# Patient Record
Sex: MALE | Race: WHITE | Employment: OTHER | URBAN - METROPOLITAN AREA
[De-identification: names, ages, dates, MRNs, and addresses within clinical notes are randomized per-mention and may not be internally consistent; named-entity substitution may affect disease eponyms.]

---

## 2017-03-01 LAB
ALBUMIN SERPL-MCNC: 3.8 G/DL
ALP BLD-CCNC: 69 U/L
ALT SERPL-CCNC: 55 U/L
AST SERPL-CCNC: 39 U/L
BILIRUB SERPL-MCNC: 1.8 MG/DL (ref 0.1–1.4)
BUN BLDV-MCNC: 18 MG/DL
CALCIUM SERPL-MCNC: 9.3 MG/DL
CHLORIDE BLD-SCNC: 103 MMOL/L
CHOLESTEROL, TOTAL: 147 MG/DL
CHOLESTEROL/HDL RATIO: NORMAL
CO2: 24 MMOL/L
CREAT SERPL-MCNC: 0.8 MG/DL
GFR CALCULATED: NORMAL
GLUCOSE BLD-MCNC: 136 MG/DL
HBA1C MFR BLD: 8.3 %
HDLC SERPL-MCNC: 34 MG/DL (ref 35–70)
LDL CHOLESTEROL CALCULATED: 83 MG/DL (ref 0–160)
POTASSIUM SERPL-SCNC: 4.6 MMOL/L
SODIUM BLD-SCNC: 143 MMOL/L
TOTAL PROTEIN: 6.6
TRIGL SERPL-MCNC: 151 MG/DL
VLDLC SERPL CALC-MCNC: NORMAL MG/DL

## 2017-03-22 ENCOUNTER — OFFICE VISIT (OUTPATIENT)
Dept: CARDIOLOGY CLINIC | Age: 77
End: 2017-03-22

## 2017-03-22 VITALS
HEART RATE: 74 BPM | OXYGEN SATURATION: 94 % | WEIGHT: 191 LBS | HEIGHT: 66 IN | RESPIRATION RATE: 16 BRPM | DIASTOLIC BLOOD PRESSURE: 82 MMHG | BODY MASS INDEX: 30.7 KG/M2 | SYSTOLIC BLOOD PRESSURE: 122 MMHG

## 2017-03-22 DIAGNOSIS — E78.2 MIXED HYPERLIPIDEMIA: ICD-10-CM

## 2017-03-22 DIAGNOSIS — E66.9 OBESITY (BMI 30-39.9): ICD-10-CM

## 2017-03-22 DIAGNOSIS — Z95.1 S/P CABG X 3: Primary | ICD-10-CM

## 2017-03-22 DIAGNOSIS — E11.9 TYPE 2 DIABETES MELLITUS WITHOUT COMPLICATION, WITHOUT LONG-TERM CURRENT USE OF INSULIN (HCC): ICD-10-CM

## 2017-03-22 DIAGNOSIS — R01.1 HEART MURMUR: ICD-10-CM

## 2017-03-22 DIAGNOSIS — I10 ESSENTIAL HYPERTENSION: ICD-10-CM

## 2017-03-22 PROCEDURE — 99214 OFFICE O/P EST MOD 30 MIN: CPT | Performed by: INTERNAL MEDICINE

## 2017-03-22 RX ORDER — ATORVASTATIN CALCIUM 40 MG/1
40 TABLET, FILM COATED ORAL DAILY
COMMUNITY

## 2017-07-13 LAB
ALBUMIN SERPL-MCNC: 3.9 G/DL
ALP BLD-CCNC: 84 U/L
ALT SERPL-CCNC: 45 U/L
ANION GAP SERPL CALCULATED.3IONS-SCNC: NORMAL MMOL/L
AST SERPL-CCNC: 55 U/L
AVERAGE GLUCOSE: NORMAL
BILIRUB SERPL-MCNC: 1.3 MG/DL (ref 0.1–1.4)
BUN BLDV-MCNC: 16 MG/DL
CALCIUM SERPL-MCNC: NORMAL MG/DL
CHLORIDE BLD-SCNC: 104 MMOL/L
CHOLESTEROL, TOTAL: 149 MG/DL
CHOLESTEROL/HDL RATIO: NORMAL
CO2: NORMAL MMOL/L
CREAT SERPL-MCNC: 0.9 MG/DL
GFR CALCULATED: NORMAL
GLUCOSE BLD-MCNC: 108 MG/DL
HBA1C MFR BLD: 8 %
HDLC SERPL-MCNC: 38 MG/DL (ref 35–70)
LDL CHOLESTEROL CALCULATED: 92 MG/DL (ref 0–160)
POTASSIUM SERPL-SCNC: 4.5 MMOL/L
SODIUM BLD-SCNC: 142 MMOL/L
TOTAL PROTEIN: NORMAL
TRIGL SERPL-MCNC: 95 MG/DL
VLDLC SERPL CALC-MCNC: NORMAL MG/DL

## 2017-09-20 ENCOUNTER — HOSPITAL ENCOUNTER (OUTPATIENT)
Dept: GENERAL RADIOLOGY | Age: 77
Discharge: HOME | End: 2017-09-20
Attending: FAMILY MEDICINE | Admitting: FAMILY MEDICINE

## 2017-09-20 DIAGNOSIS — M25.532 LEFT WRIST PAIN: ICD-10-CM

## 2017-09-20 DIAGNOSIS — M25.531 RIGHT WRIST PAIN: ICD-10-CM

## 2017-10-18 ENCOUNTER — OFFICE VISIT (OUTPATIENT)
Dept: ORTHOPEDIC SURGERY | Age: 77
End: 2017-10-18

## 2017-10-18 VITALS — WEIGHT: 196 LBS | HEIGHT: 66 IN | BODY MASS INDEX: 31.5 KG/M2 | RESPIRATION RATE: 16 BRPM

## 2017-10-18 DIAGNOSIS — M19.141 POST-TRAUMATIC OSTEOARTHRITIS OF BOTH HANDS: Primary | ICD-10-CM

## 2017-10-18 DIAGNOSIS — M19.142 POST-TRAUMATIC OSTEOARTHRITIS OF BOTH HANDS: Primary | ICD-10-CM

## 2017-10-18 PROCEDURE — 99244 OFF/OP CNSLTJ NEW/EST MOD 40: CPT | Performed by: PHYSICIAN ASSISTANT

## 2017-10-18 RX ORDER — GLIPIZIDE 10 MG/1
10 TABLET ORAL ONCE
COMMUNITY

## 2017-10-18 ASSESSMENT — ENCOUNTER SYMPTOMS
RESPIRATORY NEGATIVE: 1
EYES NEGATIVE: 1
HEARTBURN: 1

## 2017-10-18 NOTE — PROGRESS NOTES
occluded cx prox occluded rca has prox 50-60 hazy stenosis  lv is normal in size and shape with mild degree of anterior wall hypokinesis ef 55-60    H/O cardiovascular stress test 3/12/13, 3/16/2010    3/13- Abnormal study. Evidence of mild ischemia in the apical region. Rest EF 57%. No wall motion abnormality seen. Exercise capacity 7.0 METS. No change since prior study. 3/10-mild ischemia in the apical region ef is 61 exercise capactiy is normal    H/O cardiovascular stress test 4/8/2015    cardiolite-mild ischemia apical anterior EF59%, study  no changed     H/O chest x-ray 12/26/2000    blunted left costophrenic angle secondary to either pleural effusion or pleural reaction    Heart murmur 3/22/2017    History of PTCA 8/22/1994    LAD    Hyperlipidemia     Hypertension     MI (myocardial infarction) 7/94    S/P CABG x 3     LIMA->LAD, SVG->Mid OM, RCA    SOB (shortness of breath) on exertion 3/18/2015       Past Surgical History:   Procedure Laterality Date    CATARACT REMOVAL  6/12    left eye    CORONARY ARTERY BYPASS GRAFT  12/14/2000    lima-lad svg-om svg-rca    EYE SURGERY      optic tumor removed        History reviewed. No pertinent family history.     Social History     Social History    Marital status:      Spouse name: N/A    Number of children: 1    Years of education: N/A     Occupational History    retired      Social History Main Topics    Smoking status: Never Smoker    Smokeless tobacco: Never Used      Comment: reviewed 03/16/16    Alcohol use No    Drug use: No    Sexual activity: Yes     Partners: Female      Comment:      Other Topics Concern    None     Social History Narrative    None       Current Outpatient Prescriptions   Medication Sig Dispense Refill    glipiZIDE (GLUCOTROL) 10 MG tablet Take 10 mg by mouth once      atorvastatin (LIPITOR) 40 MG tablet Take 40 mg by mouth daily      tamsulosin (FLOMAX) 0.4 MG capsule Take 0.4 mg by mouth daily  lisinopril (PRINIVIL;ZESTRIL) 20 MG tablet Take 1 tablet by mouth daily 30 tablet 6    metFORMIN (GLUCOPHAGE) 500 MG tablet Take 1,000 mg by mouth 2 times daily (with meals)       metoprolol (LOPRESSOR) 50 MG tablet Take 1 tablet by mouth 2 times daily. 180 tablet 3    GuaiFENesin (MUCUS RELIEF ADULT PO) Take  by mouth.  aspirin 81 MG tablet Take 81 mg by mouth daily.  ibuprofen (ADVIL;MOTRIN) 200 MG tablet Take 200 mg by mouth every 6 hours as needed.  multivitamin (THERAGRAN) per tablet Take 1 tablet by mouth daily.  nitroGLYCERIN (NITROSTAT) 0.4 MG SL tablet Place 1 tablet under the tongue every 5 minutes as needed. 25 tablet 3     No current facility-administered medications for this visit. Allergies   Allergen Reactions    Codeine        Physical Exam:   Resp 16   Ht 5' 6\" (1.676 m)   Wt 196 lb (88.9 kg)   BMI 31.64 kg/m²    Patient is alert, appears stated age, cooperative and no distress       Right Hand/ Wrist Examination:    right wrist exam:  Skin: Skin color, texture, turgor normal. No rashes or lesions. Sensation is subjectively and objectively normal in the extremity. Vascular examination reveals 2+ radial and brachial.  Muscular strength is clinically appropriate bilaterally. right wrist exam:  -Swelling is mild over the dorsal wrist  -mild tenderness to palpation of his 1st dorsal compartment, ulnar styloid and FCU    Unless noted above, there is not any erythema, edema or cutaneous lesions of the Arm. Motor Function:  [x] No gross motor weakness of wrist [x] No gross motor weakness of hand  [x]Thumbs Up    [x]Pointer finger    [x]OK sign    [x]Cross fingers    [x]Number 5  [] Motor weakness:     ROM:  Right wrist extension - 10  Right wrist flexion - 10    Left Hand/ Wrist  examination:    Neurologic:  [x] Sensation to light touch intact.   [x] Impaired: decreased over radial aspect of left thumb  [] Decreased sensation to light touch over median moderate 1st carpometacarpal degenerative change. There is question of bony fusion at the triscaphe articulation.       Soft tissue swelling is noted.       Right  wrist:  The patient is status post proximal carpectomy.  Screws are   noted within the capitate and hamate.  Mature ossifications are noted   dorsally about the wrist.  There is soft tissue swelling.  There is lucency   surrounding one of the screws on lateral radiograph.  There is mild 1st   carpometacarpal degenerative change.  There is mild 1st MCP degenerative   change.  There are degenerative changes between the distal radius distal   carpal row.           Impression   1. Severe left wrist degenerative change most pronounced at the radiocarpal   articulation with evidence of SLAC wrist deformity.  Given the mature   ossifications about the wrist this is likely related to prior trauma. Chondrocalcinosis is noted. 2. Prior right proximal row carpectomy.  Postsurgical changes noted in the   carpus.  One of the screws is loose.  Overlying soft tissue swelling.  Mature   dorsal bony fragments.  Chondrocalcinosis. 3. Moderate right wrist degenerative change at the radiocarpal articulation. Impression:  Left wrist and hand OA, Right wrist/hand pain s/p ORIF of the hand      Plan:  The diagnosis and treatment plan was discussed in length with the patient with all questions answered. They are in agreement. We discussed the potential treatment options today. Based on the patient's previous multiple hand surgeries and trauma, I am referring the patient to a hand specialist for their expertise. 1. Continue Blue Emu cream, Voltaren gel rx offered and patient declined  2. Referral to a hand specialist, Dr. Norm Trejo. Provider Authentication Statement  Sameer Ziegler PA-C,  personally performed the services described in this documentation and they were scribed in my presence by the above listed scribe.    The documentation is both

## 2017-10-18 NOTE — Clinical Note
Thank you for the consult, Dr. Maxwell Mehta. Based on the patient's complicated hand and wrist history I am referring him to hand surgery for their expertise. Please feel free to contact me with any concerns.    Sincerely, Dennys Biggs PA-C

## 2018-01-05 LAB
ALBUMIN SERPL-MCNC: 4 G/DL
ALP BLD-CCNC: 83 U/L
ALT SERPL-CCNC: 44 U/L
ANION GAP SERPL CALCULATED.3IONS-SCNC: ABNORMAL MMOL/L
AST SERPL-CCNC: 34 U/L
AVERAGE GLUCOSE: NORMAL
BILIRUB SERPL-MCNC: 1.5 MG/DL (ref 0.1–1.4)
BUN BLDV-MCNC: 15 MG/DL
CALCIUM SERPL-MCNC: ABNORMAL MG/DL
CHLORIDE BLD-SCNC: 105 MMOL/L
CHOLESTEROL, TOTAL: 144 MG/DL
CHOLESTEROL/HDL RATIO: NORMAL
CO2: ABNORMAL MMOL/L
CREAT SERPL-MCNC: 0.9 MG/DL
GFR CALCULATED: ABNORMAL
GLUCOSE BLD-MCNC: 157 MG/DL
HBA1C MFR BLD: 7.9 %
HDLC SERPL-MCNC: 35 MG/DL (ref 35–70)
LDL CHOLESTEROL CALCULATED: 88 MG/DL (ref 0–160)
POTASSIUM SERPL-SCNC: 4 MMOL/L
SODIUM BLD-SCNC: 144 MMOL/L
TOTAL PROTEIN: ABNORMAL
TRIGL SERPL-MCNC: 107 MG/DL
VLDLC SERPL CALC-MCNC: NORMAL MG/DL

## 2018-03-14 ENCOUNTER — PROCEDURE VISIT (OUTPATIENT)
Dept: CARDIOLOGY CLINIC | Age: 78
End: 2018-03-14

## 2018-03-14 DIAGNOSIS — R01.1 HEART MURMUR: Primary | ICD-10-CM

## 2018-03-14 LAB
LV EF: 48 %
LVEF MODALITY: NORMAL

## 2018-03-14 PROCEDURE — 93306 TTE W/DOPPLER COMPLETE: CPT | Performed by: INTERNAL MEDICINE

## 2018-03-16 ENCOUNTER — TELEPHONE (OUTPATIENT)
Dept: CARDIOLOGY CLINIC | Age: 78
End: 2018-03-16

## 2018-05-02 ENCOUNTER — OFFICE VISIT (OUTPATIENT)
Dept: CARDIOLOGY CLINIC | Age: 78
End: 2018-05-02

## 2018-05-02 VITALS
HEART RATE: 74 BPM | SYSTOLIC BLOOD PRESSURE: 122 MMHG | HEIGHT: 66 IN | BODY MASS INDEX: 33.11 KG/M2 | DIASTOLIC BLOOD PRESSURE: 74 MMHG | WEIGHT: 206 LBS

## 2018-05-02 DIAGNOSIS — E66.9 OBESITY (BMI 30-39.9): ICD-10-CM

## 2018-05-02 DIAGNOSIS — R94.31 ABNORMAL ECG: ICD-10-CM

## 2018-05-02 DIAGNOSIS — E11.9 TYPE 2 DIABETES MELLITUS WITHOUT COMPLICATION, WITHOUT LONG-TERM CURRENT USE OF INSULIN (HCC): ICD-10-CM

## 2018-05-02 DIAGNOSIS — Z95.1 S/P CABG X 3: Primary | ICD-10-CM

## 2018-05-02 DIAGNOSIS — I10 ESSENTIAL HYPERTENSION: ICD-10-CM

## 2018-05-02 DIAGNOSIS — E78.2 MIXED HYPERLIPIDEMIA: ICD-10-CM

## 2018-05-02 PROCEDURE — 99214 OFFICE O/P EST MOD 30 MIN: CPT | Performed by: INTERNAL MEDICINE

## 2018-07-13 LAB
ALBUMIN SERPL-MCNC: 4.2 G/DL
ALP BLD-CCNC: 88 U/L
ALT SERPL-CCNC: 47 U/L
ANION GAP SERPL CALCULATED.3IONS-SCNC: ABNORMAL MMOL/L
AST SERPL-CCNC: 44 U/L
AVERAGE GLUCOSE: NORMAL
BILIRUB SERPL-MCNC: 1.6 MG/DL (ref 0.1–1.4)
BUN BLDV-MCNC: 15 MG/DL
CALCIUM SERPL-MCNC: ABNORMAL MG/DL
CHLORIDE BLD-SCNC: 102 MMOL/L
CHOLESTEROL, TOTAL: 157 MG/DL
CHOLESTEROL/HDL RATIO: NORMAL
CO2: ABNORMAL MMOL/L
CREAT SERPL-MCNC: 0.8 MG/DL
GFR CALCULATED: ABNORMAL
GLUCOSE BLD-MCNC: 152 MG/DL
HBA1C MFR BLD: 8.9 %
HDLC SERPL-MCNC: 35 MG/DL (ref 35–70)
LDL CHOLESTEROL CALCULATED: 88 MG/DL (ref 0–160)
POTASSIUM SERPL-SCNC: 4.8 MMOL/L
SODIUM BLD-SCNC: 144 MMOL/L
TOTAL PROTEIN: ABNORMAL
TRIGL SERPL-MCNC: 172 MG/DL
VLDLC SERPL CALC-MCNC: NORMAL MG/DL

## 2018-07-24 ENCOUNTER — HOSPITAL ENCOUNTER (OUTPATIENT)
Dept: ULTRASOUND IMAGING | Age: 78
Discharge: HOME | End: 2018-07-24
Attending: FAMILY MEDICINE | Admitting: FAMILY MEDICINE

## 2018-07-24 DIAGNOSIS — M79.621 AXILLARY PAIN, RIGHT: ICD-10-CM

## 2018-07-24 DIAGNOSIS — R59.9 ENLARGED LYMPH NODES: ICD-10-CM

## 2018-07-31 ENCOUNTER — OFFICE VISIT (OUTPATIENT)
Dept: SURGERY | Age: 78
End: 2018-07-31

## 2018-07-31 VITALS
HEART RATE: 64 BPM | OXYGEN SATURATION: 96 % | SYSTOLIC BLOOD PRESSURE: 140 MMHG | HEIGHT: 66 IN | WEIGHT: 199 LBS | DIASTOLIC BLOOD PRESSURE: 82 MMHG | BODY MASS INDEX: 31.98 KG/M2 | RESPIRATION RATE: 24 BRPM

## 2018-07-31 DIAGNOSIS — M79.621 PAIN IN RIGHT AXILLA: Primary | ICD-10-CM

## 2018-07-31 PROCEDURE — 99212 OFFICE O/P EST SF 10 MIN: CPT | Performed by: SURGERY

## 2018-07-31 ASSESSMENT — ENCOUNTER SYMPTOMS
NAUSEA: 0
HEARTBURN: 0
SPUTUM PRODUCTION: 0
COUGH: 0
VOMITING: 0

## 2018-07-31 NOTE — PROGRESS NOTES
Department of Sugey Cuevas MD   Consult Note      Reason for Consult:  Pain right axilla    Referring Physician:  Dr. Hernandez Party:    Chief Complaint   Patient presents with    Surgical Consult     NP is here for lymph node under right armpit        History Obtained From:  patient    HISTORY OF PRESENT ILLNESS:                The patient is a 78 y.o. male who presents with pain and swelling in the right axilla. He has had no fever or weight loss. Notes the swelling when he is active. US showed no abnormal lymph noddes. .    Past Medical History:    Past Medical History:   Diagnosis Date    CAD (coronary artery disease)     S/P CABG LIMA->LAD, SVG->Mid OM, RCA, PTCA-LAD and MI    Cancer ([facility] [address]     prostate- in remition    DM (diabetes mellitus) ([facility] [address] 3/18/2015    H/O cardiac catheterization 11/21/2000    LM normal, lad gives rise to diag and then is totally occluded cx prox occluded rca has prox 50-60 hazy stenosis  lv is normal in size and shape with mild degree of anterior wall hypokinesis ef 55-60    H/O cardiovascular stress test 3/12/13, 3/16/2010    3/13- Abnormal study. Evidence of mild ischemia in the apical region. Rest EF 57%. No wall motion abnormality seen. Exercise capacity 7.0 METS. No change since prior study.  3/10-mild ischemia in the apical region ef is 61 exercise capactiy is normal    H/O cardiovascular stress test 4/8/2015    cardiolite-mild ischemia apical anterior EF59%, study  no changed     H/O chest x-ray 12/26/2000    blunted left costophrenic angle secondary to either pleural effusion or pleural reaction    Heart murmur 3/22/2017    History of PTCA 8/22/1994    LAD    Hx of Doppler echocardiogram 03/14/2018    EF45-50%,mildly elevated pulmonary artery pressure    Hyperlipidemia     Hypertension     MI (myocardial infarction) 7/94    S/P CABG x 3     LIMA->LAD, SVG->Mid OM, RCA    SOB (shortness of breath) on exertion 3/18/2015      Past Surgical

## 2018-11-20 LAB
ALBUMIN SERPL-MCNC: 3.9 G/DL
ALP BLD-CCNC: 101 U/L
ALT SERPL-CCNC: 32 U/L
ANION GAP SERPL CALCULATED.3IONS-SCNC: ABNORMAL MMOL/L
AST SERPL-CCNC: 29 U/L
AVERAGE GLUCOSE: NORMAL
BILIRUB SERPL-MCNC: 1.8 MG/DL (ref 0.1–1.4)
BUN BLDV-MCNC: 13 MG/DL
CALCIUM SERPL-MCNC: ABNORMAL MG/DL
CHLORIDE BLD-SCNC: 99 MMOL/L
CHOLESTEROL, TOTAL: 157 MG/DL
CHOLESTEROL/HDL RATIO: NORMAL
CO2: ABNORMAL MMOL/L
CREAT SERPL-MCNC: 0.7 MG/DL
GFR CALCULATED: ABNORMAL
GLUCOSE BLD-MCNC: 174 MG/DL
HBA1C MFR BLD: 8.9 %
HDLC SERPL-MCNC: 35 MG/DL (ref 35–70)
LDL CHOLESTEROL CALCULATED: 99 MG/DL (ref 0–160)
POTASSIUM SERPL-SCNC: 4.6 MMOL/L
SODIUM BLD-SCNC: 137 MMOL/L
TOTAL PROTEIN: ABNORMAL
TRIGL SERPL-MCNC: 116 MG/DL
VLDLC SERPL CALC-MCNC: NORMAL MG/DL

## 2019-03-14 LAB
ALBUMIN SERPL-MCNC: 4 G/DL
ALP BLD-CCNC: 83 U/L
ALT SERPL-CCNC: 27 U/L
ANION GAP SERPL CALCULATED.3IONS-SCNC: ABNORMAL MMOL/L
AST SERPL-CCNC: 24 U/L
AVERAGE GLUCOSE: NORMAL
BILIRUB SERPL-MCNC: 2.7 MG/DL (ref 0.1–1.4)
BUN BLDV-MCNC: 14 MG/DL
CALCIUM SERPL-MCNC: ABNORMAL MG/DL
CHLORIDE BLD-SCNC: 102 MMOL/L
CHOLESTEROL, TOTAL: 145 MG/DL
CHOLESTEROL/HDL RATIO: NORMAL
CO2: ABNORMAL MMOL/L
CREAT SERPL-MCNC: 0.8 MG/DL
GFR CALCULATED: ABNORMAL
GLUCOSE BLD-MCNC: 115 MG/DL
HBA1C MFR BLD: 7.5 %
HDLC SERPL-MCNC: 40 MG/DL (ref 35–70)
LDL CHOLESTEROL CALCULATED: 82 MG/DL (ref 0–160)
POTASSIUM SERPL-SCNC: 4.1 MMOL/L
SODIUM BLD-SCNC: 138 MMOL/L
TOTAL PROTEIN: ABNORMAL
TRIGL SERPL-MCNC: 117 MG/DL
VLDLC SERPL CALC-MCNC: NORMAL MG/DL

## 2019-05-20 ENCOUNTER — OFFICE VISIT (OUTPATIENT)
Dept: CARDIOLOGY CLINIC | Age: 79
End: 2019-05-20
Payer: COMMERCIAL

## 2019-05-20 VITALS
HEIGHT: 66 IN | HEART RATE: 80 BPM | BODY MASS INDEX: 31.02 KG/M2 | SYSTOLIC BLOOD PRESSURE: 136 MMHG | WEIGHT: 193 LBS | DIASTOLIC BLOOD PRESSURE: 78 MMHG

## 2019-05-20 DIAGNOSIS — Z95.1 S/P CABG X 3: Primary | ICD-10-CM

## 2019-05-20 DIAGNOSIS — I10 ESSENTIAL HYPERTENSION: ICD-10-CM

## 2019-05-20 DIAGNOSIS — R01.1 HEART MURMUR: ICD-10-CM

## 2019-05-20 DIAGNOSIS — E11.9 TYPE 2 DIABETES MELLITUS WITHOUT COMPLICATION, WITHOUT LONG-TERM CURRENT USE OF INSULIN (HCC): ICD-10-CM

## 2019-05-20 DIAGNOSIS — E78.2 MIXED HYPERLIPIDEMIA: ICD-10-CM

## 2019-05-20 PROCEDURE — 99214 OFFICE O/P EST MOD 30 MIN: CPT | Performed by: INTERNAL MEDICINE

## 2019-05-20 NOTE — PROGRESS NOTES
Marlys Boeck  1940  Justina Tran MD    Chief complaint and HPI:  Marlys Boeck  61-year-old male follows up for coronary artery disease status post bypass surgery in year 2000 and has hypertension and hyperlipidemia and chronic obesity. He has done well cardiac-wise. He denies any chest pain or shortness of breath. He denies any palpitations, syncope or near syncope. He walks for exercise regularly and has been compliant to all his medications. He does not smoke. Rest of the Cardiovascular system review is otherwise unchanged from prior encounter. Past medical history:  has a past medical history of CAD (coronary artery disease), Cancer ([facility] [address] DM (diabetes mellitus) ([facility] [address] H/O cardiac catheterization, H/O cardiovascular stress test, H/O cardiovascular stress test, H/O chest x-ray, Heart murmur, History of PTCA, Hx of Doppler echocardiogram, Hyperlipidemia, Hypertension, MI (myocardial infarction) ([facility] [address] S/P CABG x 3, and SOB (shortness of breath) on exertion. Past surgical history:  has a past surgical history that includes Coronary artery bypass graft (12/14/2000); eye surgery; and Cataract removal (6/12). Social History:   Social History     Tobacco Use    Smoking status: Never Smoker    Smokeless tobacco: Never Used    Tobacco comment: reviewed 03/16/16   Substance Use Topics    Alcohol use: No     Family history: family history is not on file. ALLERGIES:  Codeine  Prior to Admission medications    Medication Sig Start Date End Date Taking?  Authorizing Provider   Ascorbic Acid (VITAMIN C PO) Take by mouth   Yes Historical Provider, MD   glipiZIDE (GLUCOTROL) 10 MG tablet Take 10 mg by mouth once   Yes Historical Provider, MD   atorvastatin (LIPITOR) 40 MG tablet Take 40 mg by mouth daily   Yes Historical Provider, MD   tamsulosin (FLOMAX) 0.4 MG capsule Take 0.4 mg by mouth daily   Yes Historical Provider, MD   lisinopril (PRINIVIL;ZESTRIL) 20 MG tablet Take 1 tablet by mouth daily 3/16/16  Yes Tobi Ye MD   metoprolol (LOPRESSOR) 50 MG tablet Take 1 tablet by mouth 2 times daily. 3/18/15  Yes Tobi Ye MD   GuaiFENesin (MUCUS RELIEF ADULT PO) Take  by mouth. Yes Historical Provider, MD   aspirin 81 MG tablet Take 81 mg by mouth daily. Yes Historical Provider, MD   ibuprofen (ADVIL;MOTRIN) 200 MG tablet Take 200 mg by mouth every 6 hours as needed. Yes Historical Provider, MD   nitroGLYCERIN (NITROSTAT) 0.4 MG SL tablet Place 1 tablet under the tongue every 5 minutes as needed. 3/18/15   Tobi Ye MD     Vitals:    05/20/19 1107   BP: 136/78   Pulse: 80   Weight: 193 lb (87.5 kg)   Height: 5' 6\" (1.676 m)      Body mass index is 31.15 kg/m². Wt Readings from Last 3 Encounters:   05/20/19 193 lb (87.5 kg)   07/31/18 199 lb (90.3 kg)   05/02/18 206 lb (93.4 kg)     Constitutional: Obese pleasant male in no apparent distress. He lost 13 pounds since last office visit. 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  No conjunctival pallor noted  ENT: Unremarkable  NECK: No JVP or thyromegaly  Cardiovascular: Auscultation: Normal S1 and S2.  1/6 systolic murmur in aortic area noted. No gallops. Carotids are negative for bruits. Abdominal aorta is normal.  No epigastric bruit noted. Peripheral pulses: 1+ equal in both feet  Respiratory:  Respiratory effort is normal.  Breath sounds are clear to auscultation. Extremities:  No edema, clubbing,  Cyanosis, petechiae. SKIN: Warm and well perfused, no pallor or cyanosis  Abdomen:  No masses or tenderness. No organomegaly noted. Musculoskeletal:  No spinal deformities noted. Gait is normal  Muscle strength is normal  Neurologic:  Oriented to time, place, and person and non-anxious. No focal neurological deficit noted. Psychiatric: Normal mood and effect.       LAB REVIEW:    Lipids:   Lab Results   Component Value Date    CHOL 145 03/14/2019    TRIG 117 03/14/2019    HDL 40 03/14/2019    LDLCALC 82 03/14/2019     Renal: Lab Results   Component Value Date    BUN 14 03/14/2019    CREATININE 0.80 03/14/2019     03/14/2019    K 4.1 03/14/2019     IMPRESSION and RECOMMENDATIONS:      S/P CABG x 3  Clinically stable. Continue aggressive risk modification for secondary prevention. Last stress test was 4 years ago. Recommend consider repeating a stress test next year sooner if he has any symptoms. Hypertension  Well-controlled on current medications. Continue the same. Hyperlipidemia  Last LDL was 82. Continue current statin therapy. Heart murmur  Clinically unchanged. Echocardiogram last year did not show any significant valvular disease. DM (diabetes mellitus) ([facility] [address]  Most recent hemoglobin A1c was 7.4. Continue follow-up with PCP. Continue current cardiovascular medications which have been reviewed and discussed individually with you. Continue to lose weight and exercise. Primary/secondary prevention is the goal by aggressive risk modification, healthy and therapeutic life style changes for cardiovascular risk reduction. Various goals are discussed and questions answered. Appropriate prescriptions if needed on this visit are addressed. After visit summery is provided. Questions answered and patient verbalizes understanding. Follow up in office in 12 months with ECG, sooner if needed. Ellyn Marvin MD, 5/20/2019 12:23 PM     Please note this report has been partially produced using speech recognition software and may contain errors related to that system including errors in grammar, punctuation, and spelling, as well as words and phrases that may be inappropriate. If there are any questions or concerns please feel free to contact the dictating provider for clarification.

## 2019-05-20 NOTE — PATIENT INSTRUCTIONS
Continue current cardiovascular medications which have been reviewed and discussed individually with you. Continue to lose weight and exercise. Primary/secondary prevention is the goal by aggressive risk modification, healthy and therapeutic life style changes for cardiovascular risk reduction. Various goals are discussed and questions answered. Appropriate prescriptions if needed on this visit are addressed. After visit summery is provided. Questions answered and patient verbalizes understanding. Follow up in office in 12 months with ECG, sooner if needed.

## 2019-05-20 NOTE — ASSESSMENT & PLAN NOTE
Clinically stable. Continue aggressive risk modification for secondary prevention. Last stress test was 4 years ago. Recommend consider repeating a stress test next year sooner if he has any symptoms.

## 2019-10-07 LAB
ALBUMIN SERPL-MCNC: 4 G/DL
ALP BLD-CCNC: 78 U/L
ALT SERPL-CCNC: 34 U/L
ANION GAP SERPL CALCULATED.3IONS-SCNC: NORMAL MMOL/L
AST SERPL-CCNC: 36 U/L
AVERAGE GLUCOSE: NORMAL
BILIRUB SERPL-MCNC: 1.2 MG/DL (ref 0.1–1.4)
BUN BLDV-MCNC: 16 MG/DL
CALCIUM SERPL-MCNC: NORMAL MG/DL
CHLORIDE BLD-SCNC: 101 MMOL/L
CHOLESTEROL, TOTAL: 133 MG/DL
CHOLESTEROL/HDL RATIO: ABNORMAL
CO2: NORMAL MMOL/L
CREAT SERPL-MCNC: 0.8 MG/DL
GFR CALCULATED: NORMAL
GLUCOSE BLD-MCNC: 95 MG/DL
HBA1C MFR BLD: 8.3 %
HDLC SERPL-MCNC: 32 MG/DL (ref 35–70)
LDL CHOLESTEROL CALCULATED: 77 MG/DL (ref 0–160)
POTASSIUM SERPL-SCNC: 4.3 MMOL/L
SODIUM BLD-SCNC: 141 MMOL/L
TOTAL PROTEIN: NORMAL
TRIGL SERPL-MCNC: 119 MG/DL
VLDLC SERPL CALC-MCNC: ABNORMAL MG/DL

## 2020-07-06 LAB
AVERAGE GLUCOSE: NORMAL
CHOLESTEROL, TOTAL: 135 MG/DL
CHOLESTEROL/HDL RATIO: ABNORMAL
HBA1C MFR BLD: 7.2 %
HDLC SERPL-MCNC: 32 MG/DL (ref 35–70)
LDL CHOLESTEROL CALCULATED: 74 MG/DL (ref 0–160)
TRIGL SERPL-MCNC: 145 MG/DL
VLDLC SERPL CALC-MCNC: ABNORMAL MG/DL

## 2020-07-14 ENCOUNTER — OFFICE VISIT (OUTPATIENT)
Dept: CARDIOLOGY CLINIC | Age: 80
End: 2020-07-14
Payer: COMMERCIAL

## 2020-07-14 VITALS
HEIGHT: 66 IN | SYSTOLIC BLOOD PRESSURE: 120 MMHG | WEIGHT: 195 LBS | BODY MASS INDEX: 31.34 KG/M2 | DIASTOLIC BLOOD PRESSURE: 72 MMHG | HEART RATE: 83 BPM

## 2020-07-14 PROCEDURE — 93000 ELECTROCARDIOGRAM COMPLETE: CPT | Performed by: INTERNAL MEDICINE

## 2020-07-14 PROCEDURE — 99214 OFFICE O/P EST MOD 30 MIN: CPT | Performed by: INTERNAL MEDICINE

## 2020-07-14 NOTE — ASSESSMENT & PLAN NOTE
Clinically stable. Has not had a stress test for over 5 years. Will do a nuclear stress test to evaluate coronary disease status post status post bypass surgery.

## 2020-07-14 NOTE — PROGRESS NOTES
capsule Take 0.4 mg by mouth daily   Yes Historical Provider, MD   lisinopril (PRINIVIL;ZESTRIL) 20 MG tablet Take 1 tablet by mouth daily 3/16/16  Yes Mone Pratt MD   metoprolol (LOPRESSOR) 50 MG tablet Take 1 tablet by mouth 2 times daily. 3/18/15  Yes Mone Pratt MD   nitroGLYCERIN (NITROSTAT) 0.4 MG SL tablet Place 1 tablet under the tongue every 5 minutes as needed. 3/18/15  Yes Mone Pratt MD   GuaiFENesin (MUCUS RELIEF ADULT PO) Take  by mouth. Yes Historical Provider, MD   aspirin 81 MG tablet Take 81 mg by mouth daily. Yes Historical Provider, MD   ibuprofen (ADVIL;MOTRIN) 200 MG tablet Take 200 mg by mouth every 6 hours as needed. Yes Historical Provider, MD     Vitals:    07/14/20 0943   BP: 120/72   Pulse: 83   Weight: 195 lb (88.5 kg)   Height: 5' 6\" (1.676 m)      Body mass index is 31.47 kg/m². Wt Readings from Last 3 Encounters:   07/14/20 195 lb (88.5 kg)   05/20/19 193 lb (87.5 kg)   07/31/18 199 lb (90.3 kg)     Constitutional: Obese pleasant male in no apparent distress.  He gained 2 pounds since last office visit. 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  No conjunctival pallor noted  ENT: Unremarkable  NECK: No JVP or thyromegaly  Cardiovascular:  Auscultation: Normal S1 and J2.  4/9 systolic murmur in aortic area noted.  No gallops. Carotids are negative for bruits.  Abdominal aorta is normal.  No epigastric bruit noted. Peripheral pulses: 1+ equal in both feet  Respiratory:  Respiratory effort is normal.  Breath sounds are clear to auscultation. Extremities:  No edema, clubbing,  Cyanosis, petechiae. SKIN: Warm and well perfused, no pallor or cyanosis  Abdomen:  No masses or tenderness. No organomegaly noted. Musculoskeletal:  No spinal deformities noted.  Gait is normal  Muscle strength is normal  Neurologic:  Oriented to time, place, and person and non-anxious. No focal neurological deficit noted.   Psychiatric: Normal mood and effect.     EKG today is consistent with normal sinus rhythm 83 bpm old anterior infarction occasional PVCs and left axis deviation. LAB REVIEW:  CBC: No results found for: WBC, HGB, HCT, PLT  Lipids:   Lab Results   Component Value Date    CHOL 135 07/06/2020    TRIG 145 07/06/2020    HDL 32 (A) 07/06/2020    LDLCALC 74 07/06/2020     Renal:   Lab Results   Component Value Date    BUN 16 10/07/2019    CREATININE 0.80 10/07/2019     10/07/2019    K 4.3 10/07/2019     PT/INR: No results found for: INR  Lab Results   Component Value Date    LABA1C 7.2 07/06/2020       IMPRESSION and RECOMMENDATIONS:      Hypertension  Well-controlled on current medications continue the same. Hyperlipidemia  Lipids from July 6, 2020 are reviewed. LDL was 74. Continue current statin therapy. S/P CABG x 3  Clinically stable. Has not had a stress test for over 5 years. Will do a nuclear stress test to evaluate coronary disease status post status post bypass surgery. 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 Stress Cardiolite and Echo to evaluate CAD status ( had not had testing since 2015). Continue current cardiovascular medications which have been reviewed and discussed individually with you. Primary/secondary prevention is the goal by aggressive risk modification, healthy and therapeutic life style changes for cardiovascular risk reduction. Various goals are discussed and questions answered. Appropriate prescriptions if needed on this visit are addressed. After visit summery is provided. Aranza Mckinley    We have discussed Coronavirus precaution including handwashing practice, wiping items touched in public such as gas pumps, door handles, shopping carts, etc. Self monitoring for infection - fever, chills, cough, SOB. Should they develop symptoms they should call PCP office for further instructions. ons answered and patient verbalizes understanding.  Follow up in 12 months with ECG,  sooner if needed. Moon Farmer MD, 7/14/2020 10:04 AM     Please note this report has been partially produced using speech recognition software and may contain errors related to that system including errors in grammar, punctuation, and spelling, as well as words and phrases that may be inappropriate. If there are any questions or concerns please feel free to contact the dictating provider for clarification.

## 2020-07-14 NOTE — PATIENT INSTRUCTIONS
Stress Cardiolite and Echo to evaluate CAD status ( had not had testing since 2015). Continue current cardiovascular medications which have been reviewed and discussed individually with you. Primary/secondary prevention is the goal by aggressive risk modification, healthy and therapeutic life style changes for cardiovascular risk reduction. Various goals are discussed and questions answered. Appropriate prescriptions if needed on this visit are addressed. After visit summery is provided. Questions answered and patient verbalizes understanding. Follow up in 12 months with ECG,  sooner if needed. We have discussed Coronavirus precaution including handwashing practice, wiping items touched in public such as gas pumps, door handles, shopping carts, etc. Self monitoring for infection - fever, chills, cough, SOB. Should they develop symptoms they should call PCP office for further instructions.

## 2020-08-06 ENCOUNTER — PROCEDURE VISIT (OUTPATIENT)
Dept: CARDIOLOGY CLINIC | Age: 80
End: 2020-08-06
Payer: MEDICARE

## 2020-08-06 LAB
LV EF: 53 %
LV EF: 54 %
LVEF MODALITY: NORMAL
LVEF MODALITY: NORMAL

## 2020-08-06 PROCEDURE — A9500 TC99M SESTAMIBI: HCPCS | Performed by: INTERNAL MEDICINE

## 2020-08-06 PROCEDURE — 93015 CV STRESS TEST SUPVJ I&R: CPT | Performed by: INTERNAL MEDICINE

## 2020-08-06 PROCEDURE — 93306 TTE W/DOPPLER COMPLETE: CPT | Performed by: INTERNAL MEDICINE

## 2020-08-06 PROCEDURE — 78452 HT MUSCLE IMAGE SPECT MULT: CPT | Performed by: INTERNAL MEDICINE

## 2020-08-12 ENCOUNTER — TELEPHONE (OUTPATIENT)
Dept: CARDIOLOGY CLINIC | Age: 80
End: 2020-08-12

## 2020-08-12 NOTE — TELEPHONE ENCOUNTER
Left ventricular perfusion is abnormal suggesting apical infarction without     significant ischemia. Diaphragmatic attenuation is suspected.     Normal Left ventricular size with apical hypokinesis and and preserved     systolic function. Ejection fraction is 54 %.       Recommendation     Continue aggressive lifestyle and risk modification and medical therapy.           Pt notified of ECHO and Stress test results and routine f/u; pt voiced understanding.

## 2021-01-08 LAB
ALBUMIN SERPL-MCNC: 4.3 G/DL
ALP BLD-CCNC: 83 U/L
ALT SERPL-CCNC: 38 U/L
ANION GAP SERPL CALCULATED.3IONS-SCNC: ABNORMAL MMOL/L
AST SERPL-CCNC: 45 U/L
AVERAGE GLUCOSE: NORMAL
BILIRUB SERPL-MCNC: 2.1 MG/DL (ref 0.1–1.4)
BUN BLDV-MCNC: 19 MG/DL
CALCIUM SERPL-MCNC: ABNORMAL MG/DL
CHLORIDE BLD-SCNC: 101 MMOL/L
CHOLESTEROL, TOTAL: 132 MG/DL
CHOLESTEROL/HDL RATIO: NORMAL
CO2: ABNORMAL
CREAT SERPL-MCNC: 0.9 MG/DL
GFR CALCULATED: ABNORMAL
GLUCOSE BLD-MCNC: 130 MG/DL
HBA1C MFR BLD: 7.4 %
HDLC SERPL-MCNC: 36 MG/DL (ref 35–70)
LDL CHOLESTEROL CALCULATED: 65 MG/DL (ref 0–160)
NONHDLC SERPL-MCNC: NORMAL MG/DL
POTASSIUM SERPL-SCNC: 4.6 MMOL/L
SODIUM BLD-SCNC: 140 MMOL/L
TOTAL PROTEIN: ABNORMAL
TRIGL SERPL-MCNC: 155 MG/DL
VLDLC SERPL CALC-MCNC: NORMAL MG/DL

## 2021-07-13 ENCOUNTER — OFFICE VISIT (OUTPATIENT)
Dept: CARDIOLOGY CLINIC | Age: 81
End: 2021-07-13
Payer: MEDICARE

## 2021-07-13 VITALS
HEART RATE: 74 BPM | BODY MASS INDEX: 30.86 KG/M2 | WEIGHT: 192 LBS | DIASTOLIC BLOOD PRESSURE: 72 MMHG | SYSTOLIC BLOOD PRESSURE: 138 MMHG | HEIGHT: 66 IN | RESPIRATION RATE: 16 BRPM

## 2021-07-13 DIAGNOSIS — E78.2 MIXED HYPERLIPIDEMIA: ICD-10-CM

## 2021-07-13 DIAGNOSIS — I10 ESSENTIAL HYPERTENSION: Primary | ICD-10-CM

## 2021-07-13 DIAGNOSIS — E08.00 DIABETES MELLITUS DUE TO UNDERLYING CONDITION WITH HYPEROSMOLARITY WITHOUT COMA, WITHOUT LONG-TERM CURRENT USE OF INSULIN (HCC): ICD-10-CM

## 2021-07-13 DIAGNOSIS — Z95.1 HX OF CABG: ICD-10-CM

## 2021-07-13 DIAGNOSIS — Z95.1 S/P CABG X 3: ICD-10-CM

## 2021-07-13 LAB
ALBUMIN SERPL-MCNC: 3.9 G/DL
ALP BLD-CCNC: 75 U/L
ALT SERPL-CCNC: 26 U/L
ANION GAP SERPL CALCULATED.3IONS-SCNC: 7 MMOL/L
AST SERPL-CCNC: 37 U/L
AVERAGE GLUCOSE: NORMAL
BILIRUB SERPL-MCNC: 1 MG/DL (ref 0.1–1.4)
BUN BLDV-MCNC: 15 MG/DL
CALCIUM SERPL-MCNC: 8.8 MG/DL
CHLORIDE BLD-SCNC: 103 MMOL/L
CHOLESTEROL, TOTAL: 117 MG/DL
CHOLESTEROL/HDL RATIO: 2
CO2: 28 MMOL/L
CREAT SERPL-MCNC: 0.8 MG/DL
GFR CALCULATED: 99
GLUCOSE BLD-MCNC: 103 MG/DL
HBA1C MFR BLD: 8 %
HDLC SERPL-MCNC: 36 MG/DL (ref 35–70)
LDL CHOLESTEROL CALCULATED: 60 MG/DL (ref 0–160)
NONHDLC SERPL-MCNC: NORMAL MG/DL
POTASSIUM SERPL-SCNC: 5.8 MMOL/L
SODIUM BLD-SCNC: 139 MMOL/L
TOTAL PROTEIN: 7
TRIGL SERPL-MCNC: 105 MG/DL
VLDLC SERPL CALC-MCNC: 21 MG/DL

## 2021-07-13 PROCEDURE — 93000 ELECTROCARDIOGRAM COMPLETE: CPT | Performed by: INTERNAL MEDICINE

## 2021-07-13 PROCEDURE — 99214 OFFICE O/P EST MOD 30 MIN: CPT | Performed by: INTERNAL MEDICINE

## 2021-07-13 RX ORDER — OMEPRAZOLE 20 MG/1
20 CAPSULE, DELAYED RELEASE ORAL DAILY PRN
COMMUNITY

## 2021-07-13 NOTE — PROGRESS NOTES
Rossi Daniels (:  1940) is a 68 y.o. male,     Chief Complaint   Patient presents with    Coronary Artery Disease     Pt denies any new cardiac concerns. Pt is non-smoker.  Hypertension    Hyperlipidemia     Patient is here for follow up for known coronary artery disease status post three-vessel bypass surgery in year  and has hyper lipidemia hypertension type 2 diabetes. He does very well cardiac wise. He denies any chest pain or shortness of breath or palpitations. He walks regularly for exercise. He is fully vaccinated for COVID-19. He has been compliant with his medication but he brought them all and we have reviewed them individually. He does not smoke. Allergies   Allergen Reactions    Codeine      Prior to Admission medications    Medication Sig Start Date End Date Taking? Authorizing Provider   mupirocin (BACTROBAN) 2 % ointment Apply topically daily 20  Yes Historical Provider, MD   omeprazole (PRILOSEC) 20 MG delayed release capsule Take 20 mg by mouth daily as needed   Yes Historical Provider, MD   Misc Natural Products (GLUCOSAMINE CHOND CMP ADVANCED PO) Take by mouth daily   Yes Historical Provider, MD   metFORMIN (GLUCOPHAGE) 500 MG tablet Take 500 mg by mouth 2 times daily (with meals)   Yes Historical Provider, MD   Ascorbic Acid (VITAMIN C PO) Take by mouth   Yes Historical Provider, MD   glipiZIDE (GLUCOTROL) 10 MG tablet Take 10 mg by mouth once   Yes Historical Provider, MD   atorvastatin (LIPITOR) 40 MG tablet Take 40 mg by mouth daily   Yes Historical Provider, MD   tamsulosin (FLOMAX) 0.4 MG capsule Take 0.4 mg by mouth daily   Yes Historical Provider, MD   lisinopril (PRINIVIL;ZESTRIL) 20 MG tablet Take 1 tablet by mouth daily 3/16/16  Yes Radha Mueller MD   metoprolol (LOPRESSOR) 50 MG tablet Take 1 tablet by mouth 2 times daily.  3/18/15  Yes Radha Mueller MD   nitroGLYCERIN (NITROSTAT) 0.4 MG SL tablet Place 1 tablet under the tongue every 5 minutes as needed. 3/18/15  Yes Steven Stevenson MD   GuaiFENesin (MUCUS RELIEF ADULT PO) Take  by mouth. Yes Historical Provider, MD   aspirin 81 MG tablet Take 81 mg by mouth daily. Yes Historical Provider, MD   ibuprofen (ADVIL;MOTRIN) 200 MG tablet Take 200 mg by mouth every 6 hours as needed. Yes Historical Provider, MD     Past Medical History:   Diagnosis Date    CAD (coronary artery disease)     S/P CABG LIMA->LAD, SVG->Mid OM, RCA, PTCA-LAD and MI    Cancer ([facility] [address]     prostate- in remition    DM (diabetes mellitus) ([facility] [address] 3/18/2015    H/O cardiac catheterization 11/21/2000    LM normal, lad gives rise to diag and then is totally occluded cx prox occluded rca has prox 50-60 hazy stenosis  lv is normal in size and shape with mild degree of anterior wall hypokinesis ef 55-60    H/O cardiovascular stress test 3/12/13, 3/16/2010    3/13- Abnormal study. Evidence of mild ischemia in the apical region. Rest EF 57%. No wall motion abnormality seen. Exercise capacity 7.0 METS. No change since prior study. 3/10-mild ischemia in the apical region ef is 61 exercise capactiy is normal    H/O cardiovascular stress test 4/8/2015    cardiolite-mild ischemia apical anterior EF59%, study  no changed     H/O chest x-ray 12/26/2000    blunted left costophrenic angle secondary to either pleural effusion or pleural reaction    Heart murmur 3/22/2017    History of nuclear stress test 08/06/2020    Left ventricular perfusion is abnormal suggesting apical infarction without significant ischemia. Diaphragmatic attenuation is suspected. EF 54%.  History of PTCA 8/22/1994    LAD    Hx of Doppler echocardiogram 03/14/2018; 8/6/2020    EF 50-55%.   mild aortic and mitral regurgitation    Hyperlipidemia     Hypertension     MI (myocardial infarction) ([facility] [address] 7/94    S/P CABG x 3 12/14/2000    LIMA->LAD, SVG->Mid OM, RCA    SOB (shortness of breath) on exertion 3/18/2015      Vitals:    07/13/21 1009   BP: 138/72   Pulse: 74   Resp: 16   Weight: 192 lb (87.1 kg)   Height: 5' 6\" (1.676 m)      Body mass index is 30.99 kg/m². Wt Readings from Last 3 Encounters:   07/13/21 192 lb (87.1 kg)   07/14/20 195 lb (88.5 kg)   05/20/19 193 lb (87.5 kg)     Constitutional: Obese pleasant male in no apparent distress.  He lost 3 pounds since last office visit. 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  No conjunctival pallor noted  ENT: Wearing mask and glasses  NECK: No JVP or thyromegaly  Cardiovascular:  Auscultation: Normal S1 and Y4.  1/3 systolic murmur in aortic area noted.  No gallops. Carotids are negative for bruits.  Abdominal aorta is normal.  No epigastric bruit noted. Peripheral pulses: 1+ equal in both feet  Respiratory:  Respiratory effort is normal.  Breath sounds are clear to auscultation. Extremities:  No edema, clubbing,  Cyanosis, petechiae. SKIN: Warm and well perfused, no pallor or cyanosis  Abdomen:  No masses or tenderness. No organomegaly noted. Musculoskeletal:  No spinal deformities noted.  Gait is normal  Muscle strength is normal  Neurologic:  Oriented to time, place, and person and non-anxious. No focal neurological deficit noted. 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      Pertinent records reviewed and discussed with patient and results are as follow:    No results found for: WBC, HGB, HCT, PLT  Lab Results   Component Value Date    CHOL 132 01/08/2021    TRIG 155 01/08/2021    HDL 36 01/08/2021    LDLCALC 65 01/08/2021     Lab Results   Component Value Date    BUN 19 01/08/2021    CREATININE 0.90 01/08/2021     01/08/2021    K 4.6 01/08/2021     No results found for: INR  Lab Results   Component Value Date    LABA1C 7.4 01/08/2021     ASSESSMENT/PLAN:    1. Essential hypertension  -     EKG 12 lead  2. Hx of CABG  -     EKG 12 lead  3. S/P CABG x 3  4. Mixed hyperlipidemia  5.  Diabetes mellitus due to underlying condition with hyperosmolarity without coma, without long-term current use of insulin ([address]    Continue current cardiovascular medications which have been reviewed and discussed individually with you. Primary/secondary prevention is the goal by aggressive risk modification, healthy and therapeutic life style changes for cardiovascular risk reduction. Various goals are discussed and questions answered. Follow-up in 12 months with EKG, sooner if needed. An electronic signature was used to authenticate this note.     --Gamal Cassidy MD

## 2021-07-13 NOTE — PATIENT INSTRUCTIONS
Continue current cardiovascular medications which have been reviewed and discussed individually with you. Primary/secondary prevention is the goal by aggressive risk modification, healthy and therapeutic life style changes for cardiovascular risk reduction. Various goals are discussed and questions answered. Follow-up in 12 months with EKG, sooner if needed.

## 2022-01-12 LAB
BASOPHILS ABSOLUTE: ABNORMAL
BASOPHILS RELATIVE PERCENT: ABNORMAL
CHOLESTEROL, TOTAL: 132 MG/DL
CHOLESTEROL/HDL RATIO: NORMAL
EOSINOPHILS ABSOLUTE: ABNORMAL
EOSINOPHILS RELATIVE PERCENT: ABNORMAL
HCT VFR BLD CALC: ABNORMAL %
HDLC SERPL-MCNC: 42 MG/DL (ref 35–70)
HEMOGLOBIN: 7.7 G/DL (ref 13.5–17.5)
LDL CHOLESTEROL CALCULATED: 64 MG/DL (ref 0–160)
LDL CHOLESTEROL DIRECT: 64 MG/DL
LYMPHOCYTES ABSOLUTE: ABNORMAL
LYMPHOCYTES RELATIVE PERCENT: ABNORMAL
MCH RBC QN AUTO: ABNORMAL PG
MCHC RBC AUTO-ENTMCNC: ABNORMAL G/DL
MCV RBC AUTO: ABNORMAL FL
MONOCYTES ABSOLUTE: ABNORMAL
MONOCYTES RELATIVE PERCENT: ABNORMAL
NEUTROPHILS ABSOLUTE: ABNORMAL
NEUTROPHILS RELATIVE PERCENT: ABNORMAL
NONHDLC SERPL-MCNC: NORMAL MG/DL
PLATELET # BLD: ABNORMAL 10*3/UL
PMV BLD AUTO: ABNORMAL FL
RBC # BLD: ABNORMAL 10*6/UL
TRIGL SERPL-MCNC: 130 MG/DL
VLDLC SERPL CALC-MCNC: 26 MG/DL
WBC # BLD: ABNORMAL 10*3/UL

## 2022-07-12 ENCOUNTER — OFFICE VISIT (OUTPATIENT)
Dept: CARDIOLOGY CLINIC | Age: 82
End: 2022-07-12
Payer: MEDICARE

## 2022-07-12 VITALS
HEIGHT: 66 IN | WEIGHT: 190 LBS | SYSTOLIC BLOOD PRESSURE: 118 MMHG | BODY MASS INDEX: 30.53 KG/M2 | DIASTOLIC BLOOD PRESSURE: 78 MMHG | RESPIRATION RATE: 16 BRPM | HEART RATE: 93 BPM

## 2022-07-12 DIAGNOSIS — Z95.1 S/P CABG X 3: Primary | ICD-10-CM

## 2022-07-12 DIAGNOSIS — E08.00 DIABETES MELLITUS DUE TO UNDERLYING CONDITION WITH HYPEROSMOLARITY WITHOUT COMA, WITHOUT LONG-TERM CURRENT USE OF INSULIN (HCC): ICD-10-CM

## 2022-07-12 DIAGNOSIS — E78.00 PURE HYPERCHOLESTEROLEMIA: ICD-10-CM

## 2022-07-12 DIAGNOSIS — I10 PRIMARY HYPERTENSION: ICD-10-CM

## 2022-07-12 PROCEDURE — 1123F ACP DISCUSS/DSCN MKR DOCD: CPT | Performed by: INTERNAL MEDICINE

## 2022-07-12 PROCEDURE — 93000 ELECTROCARDIOGRAM COMPLETE: CPT | Performed by: INTERNAL MEDICINE

## 2022-07-12 PROCEDURE — 99214 OFFICE O/P EST MOD 30 MIN: CPT | Performed by: INTERNAL MEDICINE

## 2022-07-12 NOTE — PROGRESS NOTES
Rosangela Huffman  1940  Nicole Castorena MD      Chief Complaint   Patient presents with    Coronary Artery Disease     Pt denies any new cardiac concerns. Pt is non-smoker.  Hypertension    Hyperlipidemia     Chief complaint and HPI:  Rosangela Huffman  is a 80 y.o. male following up known coronary artery disease status post bypass surgery in year 2000 and has hypertension hyperlipidemia and diabetes. He denies any cardiac symptoms. He walks 25 minutes a day for exercise denies any chest pain or shortness of breath or palpitations. He finds himself somewhat unsteady when he gets up to the exam table but denies having any falls since last seen a year ago. Denies any unsteadiness when he is walking for exercise. He is compliant to his medications. He does not smoke. Rest of the Cardiovascular system review is otherwise unchanged from prior encounter. Past medical history:  has a past medical history of CAD (coronary artery disease), Cancer ([facility] [address] DM (diabetes mellitus) ([facility] [address] H/O cardiac catheterization, H/O cardiovascular stress test, H/O cardiovascular stress test, H/O chest x-ray, Heart murmur, History of nuclear stress test, History of PTCA, Hx of Doppler echocardiogram, Hyperlipidemia, Hypertension, MI (myocardial infarction) ([facility] [address] S/P CABG x 3, and SOB (shortness of breath) on exertion. Past surgical history:  has a past surgical history that includes Coronary artery bypass graft (12/14/2000); eye surgery; and Cataract removal (6/12). Social History:   Social History     Tobacco Use    Smoking status: Never Smoker    Smokeless tobacco: Never Used    Tobacco comment: reviewed 03/16/16   Substance Use Topics    Alcohol use: No     Family history: family history is not on file. ALLERGIES:  Codeine  Prior to Admission medications    Medication Sig Start Date End Date Taking?  Authorizing Provider   mupirocin (BACTROBAN) 2 % ointment Apply topically daily 11/2/20  Yes Historical Provider, MD omeprazole (PRILOSEC) 20 MG delayed release capsule Take 20 mg by mouth daily as needed   Yes Historical Provider, MD   Misc Natural Products (GLUCOSAMINE CHOND CMP ADVANCED PO) Take by mouth daily   Yes Historical Provider, MD   metFORMIN (GLUCOPHAGE) 500 MG tablet Take 500 mg by mouth 2 times daily (with meals)   Yes Historical Provider, MD   Ascorbic Acid (VITAMIN C PO) Take by mouth   Yes Historical Provider, MD   glipiZIDE (GLUCOTROL) 10 MG tablet Take 10 mg by mouth once   Yes Historical Provider, MD   atorvastatin (LIPITOR) 40 MG tablet Take 40 mg by mouth daily   Yes Historical Provider, MD   tamsulosin (FLOMAX) 0.4 MG capsule Take 0.4 mg by mouth daily   Yes Historical Provider, MD   lisinopril (PRINIVIL;ZESTRIL) 20 MG tablet Take 1 tablet by mouth daily 3/16/16  Yes Alejo Jennings MD   metoprolol (LOPRESSOR) 50 MG tablet Take 1 tablet by mouth 2 times daily. 3/18/15  Yes Alejo Jennings MD   nitroGLYCERIN (NITROSTAT) 0.4 MG SL tablet Place 1 tablet under the tongue every 5 minutes as needed. 3/18/15  Yes Alejo Jennings MD   GuaiFENesin (MUCUS RELIEF ADULT PO) Take  by mouth. Yes Historical Provider, MD   aspirin 81 MG tablet Take 81 mg by mouth daily. Yes Historical Provider, MD   ibuprofen (ADVIL;MOTRIN) 200 MG tablet Take 200 mg by mouth every 6 hours as needed. Yes Historical Provider, MD     Vitals:    07/12/22 1034   BP: 118/78   Pulse: 93   Resp: 16   Weight: 190 lb (86.2 kg)   Height: 5' 6\" (1.676 m)      Body mass index is 30.67 kg/m². Wt Readings from Last 3 Encounters:   07/12/22 190 lb (86.2 kg)   07/13/21 192 lb (87.1 kg)   07/14/20 195 lb (88.5 kg)     Constitutional: Obese pleasant male in no apparent distress.  He lost 2 pounds since last office visit. 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  No conjunctival pallor noted  ENT: Wearing mask and glasses  NECK: No JVP or thyromegaly  Cardiovascular:  Auscultation: Normal S1 and M0.  4/8 systolic murmur in aortic area noted.  Carotids are negative for bruits.  Abdominal aorta is normal.  No epigastric bruit noted. Peripheral pulses: 1+ equal in both feet  Respiratory:  Respiratory effort is normal. Breath sounds are clear to auscultation. Extremities:  No edema, clubbing,  Cyanosis, petechiae. SKIN: Warm and well perfused, no pallor or cyanosis  Abdomen:  No masses or tenderness. No organomegaly noted. Musculoskeletal:  No spinal deformities noted.  Gait is normal  Muscle strength is normal  Neurologic:  Oriented to time, place, and person and non-anxious. No focal neurological deficit noted. Psychiatric: Normal mood and effect    EKG today is consistent with sinus rhythm 93 bpm with evidence of old anterior infarction and left axis deviation. LAB REVIEW:  CBC:   Lab Results   Component Value Date/Time    HGB 7.7 01/12/2022 12:00 AM     Lipids:   Lab Results   Component Value Date    CHOL 132 01/12/2022    TRIG 130 01/12/2022    HDL 42 01/12/2022    LDLCALC 64 01/12/2022     Renal:   Lab Results   Component Value Date/Time    BUN 15 07/13/2021 12:00 AM    CREATININE 0.80 07/13/2021 12:00 AM     07/13/2021 12:00 AM    K 5.8 07/13/2021 12:00 AM   Recent Data from [facility] [address]  Related to HEMOGLOBIN A1C  Component 01/12/22 07/13/21 01/08/21   HEMOGLOBIN A1C 7.7  8.0 High   7.4 High       IMPRESSION and RECOMMENDATIONS:      Hypertension  Well-controlled on metoprolol and Zestril. Continue both. Hyperlipidemia  Well-controlled on current dose of atorvastatin 40 mg daily. Last LDL was 63. Continue the same. S/P CABG x 3, 2000  Clinically stable. Continue aggressive risk factor modification for secondary prevention. Last stress test was in 2020. Abnormal ECG  Last hemoglobin A1c was 7.7 which is an improvement from 8.0 last year. Follows up with PCP closely and he takes Glucotrol and metformin.      Continue current cardiovascular medications which have been reviewed and discussed individually with you. Primary/secondary prevention is the goal by aggressive risk modification, healthy and therapeutic life style changes for cardiovascular risk reduction. Various goals are discussed and questions answered. Appropriate prescriptions if needed on this visit are addressed. After visit summery is provided. Questions answered and patient verbalizes understanding. Follow up in 12 months with EKG,  sooner if needed. Val Hurtado MD, 7/12/2022 11:01 AM     Please note this report has been partially produced using speech recognition software and may contain errors related to that system including errors in grammar, punctuation, and spelling, as well as words and phrases that may be inappropriate. If there are any questions or concerns please feel free to contact the dictating provider for clarification.

## 2022-07-12 NOTE — ASSESSMENT & PLAN NOTE
Last hemoglobin A1c was 7.7 which is an improvement from 8.0 last year. Follows up with PCP closely and he takes Glucotrol and metformin.

## 2022-07-12 NOTE — ASSESSMENT & PLAN NOTE
Clinically stable. Continue aggressive risk factor modification for secondary prevention. Last stress test was in 2020.

## 2022-07-12 NOTE — PATIENT INSTRUCTIONS
Continue current cardiovascular medications which have been reviewed and discussed individually with you. Primary/secondary prevention is the goal by aggressive risk modification, healthy and therapeutic life style changes for cardiovascular risk reduction. Various goals are discussed and questions answered. Appropriate prescriptions if needed on this visit are addressed. After visit summery is provided. Questions answered and patient verbalizes understanding. Follow up in 12 months with EKG,  sooner if needed.

## 2022-07-27 ENCOUNTER — HOSPITAL ENCOUNTER (OUTPATIENT)
Age: 82
Discharge: HOME | End: 2022-07-27
Payer: MEDICARE

## 2022-07-27 LAB — PROSTATE SPECIFIC ANTIGEN: 0.02 NG/ML (ref 0–4)

## 2022-07-27 PROCEDURE — 84153 ASSAY OF PSA TOTAL: CPT

## 2022-07-27 PROCEDURE — 36415 COLL VENOUS BLD VENIPUNCTURE: CPT

## 2022-07-27 PROCEDURE — G0103 PSA SCREENING: HCPCS

## 2023-02-20 LAB
ALBUMIN SERPL-MCNC: 4.5 G/DL
ALP BLD-CCNC: 81 U/L
ALT SERPL-CCNC: 32 U/L
ANION GAP SERPL CALCULATED.3IONS-SCNC: 12.2 MMOL/L
AST SERPL-CCNC: 34 U/L
AVERAGE GLUCOSE: NORMAL
BILIRUB SERPL-MCNC: 1.3 MG/DL (ref 0.1–1.4)
BUN BLDV-MCNC: 19 MG/DL
CALCIUM SERPL-MCNC: 9.3 MG/DL
CHLORIDE BLD-SCNC: 104 MMOL/L
CHOLESTEROL, TOTAL: 139 MG/DL
CHOLESTEROL/HDL RATIO: 75
CO2: 28 MMOL/L
CREAT SERPL-MCNC: 0.88 MG/DL
EGFR: 88
GLUCOSE BLD-MCNC: 80 MG/DL
HBA1C MFR BLD: 8.3 %
HDLC SERPL-MCNC: 37 MG/DL (ref 35–70)
LDL CHOLESTEROL CALCULATED: 75 MG/DL (ref 0–160)
NONHDLC SERPL-MCNC: NORMAL MG/DL
POTASSIUM SERPL-SCNC: 4.9 MMOL/L
SODIUM BLD-SCNC: 144 MMOL/L
TOTAL PROTEIN: 7.7
TRIGL SERPL-MCNC: 133 MG/DL
VLDLC SERPL CALC-MCNC: 27 MG/DL

## 2023-07-18 ENCOUNTER — OFFICE VISIT (OUTPATIENT)
Dept: CARDIOLOGY CLINIC | Age: 83
End: 2023-07-18
Payer: MEDICARE

## 2023-07-18 VITALS
HEART RATE: 85 BPM | WEIGHT: 189.4 LBS | BODY MASS INDEX: 29.73 KG/M2 | HEIGHT: 67 IN | DIASTOLIC BLOOD PRESSURE: 84 MMHG | SYSTOLIC BLOOD PRESSURE: 152 MMHG

## 2023-07-18 DIAGNOSIS — I10 ESSENTIAL HYPERTENSION: ICD-10-CM

## 2023-07-18 DIAGNOSIS — E08.00 DIABETES MELLITUS DUE TO UNDERLYING CONDITION WITH HYPEROSMOLARITY WITHOUT COMA, WITHOUT LONG-TERM CURRENT USE OF INSULIN (HCC): ICD-10-CM

## 2023-07-18 DIAGNOSIS — Z95.1 S/P CABG X 3: Primary | ICD-10-CM

## 2023-07-18 DIAGNOSIS — I10 PRIMARY HYPERTENSION: ICD-10-CM

## 2023-07-18 DIAGNOSIS — E78.00 PURE HYPERCHOLESTEROLEMIA: ICD-10-CM

## 2023-07-18 PROCEDURE — 99214 OFFICE O/P EST MOD 30 MIN: CPT | Performed by: INTERNAL MEDICINE

## 2023-07-18 PROCEDURE — 1123F ACP DISCUSS/DSCN MKR DOCD: CPT | Performed by: INTERNAL MEDICINE

## 2023-07-18 PROCEDURE — 3077F SYST BP >= 140 MM HG: CPT | Performed by: INTERNAL MEDICINE

## 2023-07-18 PROCEDURE — 3079F DIAST BP 80-89 MM HG: CPT | Performed by: INTERNAL MEDICINE

## 2023-07-18 PROCEDURE — 93000 ELECTROCARDIOGRAM COMPLETE: CPT | Performed by: INTERNAL MEDICINE

## 2023-07-18 RX ORDER — LISINOPRIL 20 MG/1
20 TABLET ORAL DAILY
Qty: 90 TABLET | Refills: 1 | Status: SHIPPED | OUTPATIENT
Start: 2023-07-18

## 2023-07-18 NOTE — ASSESSMENT & PLAN NOTE
Clinically stable. Continue aggressive risk factor modification for secondary prevention. Last nuclear stress test was July 2020.

## 2023-07-18 NOTE — PROGRESS NOTES
Rosalina Trujillo  1940  Evelyne Ritchie MD      Chief Complaint   Patient presents with    Coronary Artery Disease    Hyperlipidemia    Hypertension     Complains of swelling in legs and had gout Dr Hill gave him medicine and states its better   No chest pain SOB dizziness or palpitations  Pt stated his Lisinopril he hasnt taken in several days due to being out but just got a refill called into pharmacy     Chief complaint and HPI:  Rosalina Trujillo  is a 82 y.o. male following up for known coronary artery disease status post bypass surgery and hypertension and hyperlipidemia and has diabetes. Denies any cardiac symptoms today. He ran out of lisinopril did not take it for the last 3 days. He had a fall recently has soreness in the left gluteal area. He is walking with a quad cane. He brought all his medications and we have reviewed them and reconciled. He does not smoke. Rest of the Cardiovascular system review is otherwise unchanged from prior encounter. Past medical history:  has a past medical history of CAD (coronary artery disease), Cancer ([address]), DM (diabetes mellitus) ([address]), H/O cardiac catheterization, H/O cardiovascular stress test, H/O cardiovascular stress test, H/O chest x-ray, Heart murmur, History of nuclear stress test, History of PTCA, Hx of Doppler echocardiogram, Hyperlipidemia, Hypertension, MI (myocardial infarction) ([address]), S/P CABG x 3, and SOB (shortness of breath) on exertion. Past surgical history:  has a past surgical history that includes Coronary artery bypass graft (12/14/2000); eye surgery; and Cataract removal (6/12). Social History:   Social History     Tobacco Use    Smoking status: Never    Smokeless tobacco: Never    Tobacco comments:     reviewed 03/16/16   Substance Use Topics    Alcohol use: No     Family history: family history is not on file. ALLERGIES:  Codeine  Prior to Admission medications    Medication Sig Start Date End Date Taking?  Authorizing Provider

## 2023-07-18 NOTE — ASSESSMENT & PLAN NOTE
Fairly well-controlled on Lipitor 40 mg daily. Last LDL was 75 on current dose of statin therapy. LDL goal is less than 70 if he continues to be more than 70 we will increase the dose to 80 mg daily if tolerated.

## 2023-07-18 NOTE — ASSESSMENT & PLAN NOTE
Hemoglobin A1c was 8.3 which is higher than previous results last year. Patient follows up with PCP.

## 2023-07-18 NOTE — PATIENT INSTRUCTIONS
Continue current cardiovascular medications which have been reviewed and discussed individually with you. Patient is educated about being high risk for fall. Maximum fall precautions counseled. Questions answered. Patient verbalized understanding. Primary/secondary prevention is the goal by aggressive risk modification, healthy and therapeutic life style changes for cardiovascular risk reduction. Various goals are discussed and questions answered. Appropriate prescriptions if needed on this visit are addressed. After visit summery is provided. Questions answered and patient verbalizes understanding. Follow up in 12 months with ECG,  sooner if needed.

## 2023-07-24 NOTE — ASSESSMENT & PLAN NOTE
Today's blood pressure is slightly higher than what is normal for him because he has not taken lisinopril for the last 3 days. New prescription is given for him to fill the prescription and start taking and start monitoring blood pressure at home. Low Dose Naltrexone Pregnancy And Lactation Text: Naltrexone is pregnancy category C.  There have been no adequate and well-controlled studies in pregnant women.  It should be used in pregnancy only if the potential benefit justifies the potential risk to the fetus.   Limited data indicates that naltrexone is minimally excreted into breastmilk.

## 2023-08-23 LAB
ALBUMIN SERPL-MCNC: 4.4 G/DL
ALP BLD-CCNC: 71 U/L
ALT SERPL-CCNC: 38 U/L
ANION GAP SERPL CALCULATED.3IONS-SCNC: 5.9 MMOL/L
AST SERPL-CCNC: 43 U/L
AVERAGE GLUCOSE: NORMAL
BILIRUB SERPL-MCNC: 1.5 MG/DL (ref 0.1–1.4)
BUN BLDV-MCNC: 15 MG/DL
CALCIUM SERPL-MCNC: 9.2 MG/DL
CHLORIDE BLD-SCNC: 101 MMOL/L
CHOLESTEROL, TOTAL: 156 MG/DL
CHOLESTEROL/HDL RATIO: 2
CO2: 32 MMOL/L
CREAT SERPL-MCNC: 0.88 MG/DL
EGFR: 88
GLUCOSE BLD-MCNC: 123 MG/DL
HBA1C MFR BLD: 8.6 %
HDLC SERPL-MCNC: 45 MG/DL (ref 35–70)
LDL CHOLESTEROL CALCULATED: 71 MG/DL (ref 0–160)
NONHDLC SERPL-MCNC: NORMAL MG/DL
POTASSIUM SERPL-SCNC: 4.5 MMOL/L
SODIUM BLD-SCNC: 139 MMOL/L
TOTAL PROTEIN: 7.3
TRIGL SERPL-MCNC: 198 MG/DL
VLDLC SERPL CALC-MCNC: 40 MG/DL

## 2023-09-05 DIAGNOSIS — I10 ESSENTIAL HYPERTENSION: ICD-10-CM

## 2023-09-05 RX ORDER — LISINOPRIL 20 MG/1
20 TABLET ORAL DAILY
Qty: 90 TABLET | Refills: 3 | Status: SHIPPED | OUTPATIENT
Start: 2023-09-05

## 2023-09-11 ENCOUNTER — HOSPITAL ENCOUNTER (INPATIENT)
Age: 83
LOS: 2 days | Discharge: HOME | DRG: 189 | End: 2023-09-14
Attending: EMERGENCY MEDICINE | Admitting: INTERNAL MEDICINE
Payer: MEDICARE

## 2023-09-11 DIAGNOSIS — R09.02 HYPOXIA: ICD-10-CM

## 2023-09-11 DIAGNOSIS — J12.9 VIRAL PNEUMONIA: Primary | ICD-10-CM

## 2023-09-11 DIAGNOSIS — B34.8 RHINOVIRUS INFECTION: ICD-10-CM

## 2023-09-11 PROCEDURE — 99285 EMERGENCY DEPT VISIT HI MDM: CPT

## 2023-09-11 RX ORDER — IPRATROPIUM BROMIDE AND ALBUTEROL SULFATE 2.5; .5 MG/3ML; MG/3ML
1 SOLUTION RESPIRATORY (INHALATION) ONCE
Status: COMPLETED | OUTPATIENT
Start: 2023-09-11 | End: 2023-09-12

## 2023-09-11 ASSESSMENT — PAIN - FUNCTIONAL ASSESSMENT: PAIN_FUNCTIONAL_ASSESSMENT: NONE - DENIES PAIN

## 2023-09-12 ENCOUNTER — APPOINTMENT (OUTPATIENT)
Dept: GENERAL RADIOLOGY | Age: 83
DRG: 189 | End: 2023-09-12
Payer: MEDICARE

## 2023-09-12 PROBLEM — B34.8 RHINOVIRUS: Status: ACTIVE | Noted: 2023-09-12

## 2023-09-12 LAB
ALBUMIN SERPL-MCNC: 4.1 GM/DL (ref 3.4–5)
ALP BLD-CCNC: 82 IU/L (ref 40–129)
ALT SERPL-CCNC: 31 U/L (ref 10–40)
ANION GAP SERPL CALCULATED.3IONS-SCNC: 12 MMOL/L (ref 4–16)
ANION GAP SERPL CALCULATED.3IONS-SCNC: 13 MMOL/L (ref 4–16)
ANISOCYTOSIS: ABNORMAL
AST SERPL-CCNC: 19 IU/L (ref 15–37)
B PARAP IS1001 DNA NPH QL NAA+NON-PROBE: NOT DETECTED
B PERT.PT PRMT NPH QL NAA+NON-PROBE: NOT DETECTED
BACTERIA: ABNORMAL /HPF
BASE EXCESS MIXED: 5.8 (ref 0–1.2)
BASE EXCESS: ABNORMAL (ref 0–3.3)
BILIRUB SERPL-MCNC: 1.5 MG/DL (ref 0–1)
BILIRUBIN URINE: NEGATIVE MG/DL
BLOOD, URINE: NEGATIVE
BUN SERPL-MCNC: 11 MG/DL (ref 6–23)
BUN SERPL-MCNC: 13 MG/DL (ref 6–23)
C PNEUM DNA NPH QL NAA+NON-PROBE: NOT DETECTED
CALCIUM SERPL-MCNC: 8.8 MG/DL (ref 8.3–10.6)
CALCIUM SERPL-MCNC: 9.1 MG/DL (ref 8.3–10.6)
CAST TYPE: NEGATIVE /HPF
CHLORIDE BLD-SCNC: 100 MMOL/L (ref 99–110)
CHLORIDE BLD-SCNC: 102 MMOL/L (ref 99–110)
CLARITY: CLEAR
CO2 CONTENT: 20.7 MMOL/L (ref 19–24)
CO2: 25 MMOL/L (ref 21–32)
CO2: 27 MMOL/L (ref 21–32)
COLOR: YELLOW
CREAT SERPL-MCNC: 0.9 MG/DL (ref 0.9–1.3)
CREAT SERPL-MCNC: 0.9 MG/DL (ref 0.9–1.3)
CRYSTAL TYPE: NEGATIVE /HPF
DIFFERENTIAL TYPE: ABNORMAL
EKG ATRIAL RATE: 75 BPM
EKG DIAGNOSIS: NORMAL
EKG P AXIS: 23 DEGREES
EKG P-R INTERVAL: 198 MS
EKG Q-T INTERVAL: 390 MS
EKG QRS DURATION: 126 MS
EKG QTC CALCULATION (BAZETT): 435 MS
EKG R AXIS: -59 DEGREES
EKG T AXIS: 189 DEGREES
EKG VENTRICULAR RATE: 75 BPM
EOSINOPHILS ABSOLUTE: 1.2 K/CU MM
EOSINOPHILS RELATIVE PERCENT: 6 % (ref 0–3)
EPITHELIAL CELLS, UA: 10 /HPF
FLUAV H1 2009 PAN RNA NPH NAA+NON-PROBE: NOT DETECTED
FLUAV H1 RNA NPH QL NAA+NON-PROBE: NOT DETECTED
FLUAV H3 RNA NPH QL NAA+NON-PROBE: NOT DETECTED
FLUAV RNA NPH QL NAA+NON-PROBE: NOT DETECTED
FLUBV RNA NPH QL NAA+NON-PROBE: NOT DETECTED
GFR SERPL CREATININE-BSD FRML MDRD: >60 ML/MIN/1.73M2
GFR SERPL CREATININE-BSD FRML MDRD: >60 ML/MIN/1.73M2
GLUCOSE BLD-MCNC: 272 MG/DL (ref 70–99)
GLUCOSE BLD-MCNC: 317 MG/DL (ref 70–99)
GLUCOSE BLD-MCNC: 399 MG/DL (ref 70–99)
GLUCOSE BLD-MCNC: 409 MG/DL (ref 70–99)
GLUCOSE SERPL-MCNC: 160 MG/DL (ref 70–99)
GLUCOSE SERPL-MCNC: 179 MG/DL (ref 70–99)
GLUCOSE, URINE: 250 MG/DL
HADV DNA NPH QL NAA+NON-PROBE: NOT DETECTED
HCO3 VENOUS: 19.6 MMOL/L (ref 19–25)
HCOV 229E RNA NPH QL NAA+NON-PROBE: NOT DETECTED
HCOV HKU1 RNA NPH QL NAA+NON-PROBE: NOT DETECTED
HCOV NL63 RNA NPH QL NAA+NON-PROBE: NOT DETECTED
HCOV OC43 RNA NPH QL NAA+NON-PROBE: NOT DETECTED
HCT VFR BLD CALC: 39.1 % (ref 42–52)
HCT VFR BLD CALC: 39.3 % (ref 42–52)
HEMOGLOBIN: 12.4 GM/DL (ref 13.5–18)
HEMOGLOBIN: 12.7 GM/DL (ref 13.5–18)
HMPV RNA NPH QL NAA+NON-PROBE: NOT DETECTED
HPIV1 RNA NPH QL NAA+NON-PROBE: NOT DETECTED
HPIV2 RNA NPH QL NAA+NON-PROBE: NOT DETECTED
HPIV3 RNA NPH QL NAA+NON-PROBE: NOT DETECTED
HPIV4 RNA NPH QL NAA+NON-PROBE: NOT DETECTED
KETONES, URINE: 15 MG/DL
L PNEUMO AG UR QL IA: NEGATIVE
LACTIC ACID, SEPSIS: 3.8 MMOL/L (ref 0.5–1.9)
LACTIC ACID, SEPSIS: 4.2 MMOL/L (ref 0.5–1.9)
LACTIC ACID, SEPSIS: 5.3 MMOL/L (ref 0.5–1.9)
LACTIC ACID, SEPSIS: 7.6 MMOL/L (ref 0.5–1.9)
LACTIC ACID, SEPSIS: 9.5 MMOL/L (ref 0.5–1.9)
LEUKOCYTE ESTERASE, URINE: NEGATIVE
LYMPHOCYTES ABSOLUTE: 3.4 K/CU MM
LYMPHOCYTES RELATIVE PERCENT: 17 % (ref 24–44)
M PNEUMO DNA NPH QL NAA+NON-PROBE: NOT DETECTED
MCH RBC QN AUTO: 28.6 PG (ref 27–31)
MCH RBC QN AUTO: 28.8 PG (ref 27–31)
MCHC RBC AUTO-ENTMCNC: 31.7 % (ref 32–36)
MCHC RBC AUTO-ENTMCNC: 32.3 % (ref 32–36)
MCV RBC AUTO: 89.1 FL (ref 78–100)
MCV RBC AUTO: 90.1 FL (ref 78–100)
MICROCYTES: ABNORMAL
MONOCYTES ABSOLUTE: 1.4 K/CU MM
MONOCYTES RELATIVE PERCENT: 7 % (ref 0–4)
NITRITE URINE, QUANTITATIVE: NEGATIVE
O2 SAT, VEN: 98.8 % (ref 50–70)
OVALOCYTES: ABNORMAL
PCO2, VEN: 37.1 MMHG (ref 38–52)
PDW BLD-RTO: 12.9 % (ref 11.7–14.9)
PDW BLD-RTO: 13 % (ref 11.7–14.9)
PH VENOUS: 7.33 (ref 7.32–7.42)
PH, URINE: 6 (ref 5–8)
PLATELET # BLD: 315 K/CU MM (ref 140–440)
PLATELET # BLD: 324 K/CU MM (ref 140–440)
PMV BLD AUTO: 9.2 FL (ref 7.5–11.1)
PMV BLD AUTO: 9.3 FL (ref 7.5–11.1)
PO2, VEN: 131.7 MMHG (ref 28–48)
POTASSIUM SERPL-SCNC: 4.4 MMOL/L (ref 3.5–5.1)
POTASSIUM SERPL-SCNC: 4.4 MMOL/L (ref 3.5–5.1)
PRO-BNP: 413.4 PG/ML
PROCALCITONIN SERPL-MCNC: 0.06 NG/ML
PROTEIN UA: ABNORMAL MG/DL
RBC # BLD: 4.34 M/CU MM (ref 4.6–6.2)
RBC # BLD: 4.41 M/CU MM (ref 4.6–6.2)
RBC URINE: 0 /HPF (ref 0–3)
RSV RNA NPH QL NAA+NON-PROBE: NOT DETECTED
RV+EV RNA NPH QL NAA+NON-PROBE: ABNORMAL
S PNEUM AG CSF QL: NORMAL
SARS-COV-2 RNA NPH QL NAA+NON-PROBE: NOT DETECTED
SEGMENTED NEUTROPHILS ABSOLUTE COUNT: 13.4 K/CU MM
SEGMENTED NEUTROPHILS RELATIVE PERCENT: 68 % (ref 36–66)
SODIUM BLD-SCNC: 138 MMOL/L (ref 135–145)
SODIUM BLD-SCNC: 141 MMOL/L (ref 135–145)
SOURCE, BLOOD GAS: ABNORMAL
SPECIFIC GRAVITY UA: >1.03 (ref 1–1.03)
TOTAL PROTEIN: 6.5 GM/DL (ref 6.4–8.2)
TROPONIN T: <0.01 NG/ML
UNDIFFERENTIATED CELL: 2 %
UROBILINOGEN, URINE: 0.2 MG/DL (ref 0.2–1)
WBC # BLD: 17.2 K/CU MM (ref 4–10.5)
WBC # BLD: 19.7 K/CU MM (ref 4–10.5)
WBC UA: 2 /HPF (ref 0–2)

## 2023-09-12 PROCEDURE — 85007 BL SMEAR W/DIFF WBC COUNT: CPT

## 2023-09-12 PROCEDURE — 80048 BASIC METABOLIC PNL TOTAL CA: CPT

## 2023-09-12 PROCEDURE — 6360000002 HC RX W HCPCS: Performed by: EMERGENCY MEDICINE

## 2023-09-12 PROCEDURE — 96375 TX/PRO/DX INJ NEW DRUG ADDON: CPT

## 2023-09-12 PROCEDURE — 85027 COMPLETE CBC AUTOMATED: CPT

## 2023-09-12 PROCEDURE — 6370000000 HC RX 637 (ALT 250 FOR IP): Performed by: EMERGENCY MEDICINE

## 2023-09-12 PROCEDURE — 94640 AIRWAY INHALATION TREATMENT: CPT

## 2023-09-12 PROCEDURE — 96372 THER/PROPH/DIAG INJ SC/IM: CPT

## 2023-09-12 PROCEDURE — 83605 ASSAY OF LACTIC ACID: CPT

## 2023-09-12 PROCEDURE — 97166 OT EVAL MOD COMPLEX 45 MIN: CPT

## 2023-09-12 PROCEDURE — 1200000000 HC SEMI PRIVATE

## 2023-09-12 PROCEDURE — 6360000002 HC RX W HCPCS: Performed by: FAMILY MEDICINE

## 2023-09-12 PROCEDURE — 94761 N-INVAS EAR/PLS OXIMETRY MLT: CPT

## 2023-09-12 PROCEDURE — 97530 THERAPEUTIC ACTIVITIES: CPT

## 2023-09-12 PROCEDURE — 82962 GLUCOSE BLOOD TEST: CPT

## 2023-09-12 PROCEDURE — 6370000000 HC RX 637 (ALT 250 FOR IP): Performed by: FAMILY MEDICINE

## 2023-09-12 PROCEDURE — G0378 HOSPITAL OBSERVATION PER HR: HCPCS

## 2023-09-12 PROCEDURE — 36415 COLL VENOUS BLD VENIPUNCTURE: CPT

## 2023-09-12 PROCEDURE — 81001 URINALYSIS AUTO W/SCOPE: CPT

## 2023-09-12 PROCEDURE — 87040 BLOOD CULTURE FOR BACTERIA: CPT

## 2023-09-12 PROCEDURE — 82800 BLOOD PH: CPT

## 2023-09-12 PROCEDURE — 0202U NFCT DS 22 TRGT SARS-COV-2: CPT

## 2023-09-12 PROCEDURE — 84145 PROCALCITONIN (PCT): CPT

## 2023-09-12 PROCEDURE — 87086 URINE CULTURE/COLONY COUNT: CPT

## 2023-09-12 PROCEDURE — 51798 US URINE CAPACITY MEASURE: CPT

## 2023-09-12 PROCEDURE — 84484 ASSAY OF TROPONIN QUANT: CPT

## 2023-09-12 PROCEDURE — 93010 ELECTROCARDIOGRAM REPORT: CPT | Performed by: INTERNAL MEDICINE

## 2023-09-12 PROCEDURE — 2580000003 HC RX 258: Performed by: FAMILY MEDICINE

## 2023-09-12 PROCEDURE — 2580000003 HC RX 258: Performed by: NURSE PRACTITIONER

## 2023-09-12 PROCEDURE — 83880 ASSAY OF NATRIURETIC PEPTIDE: CPT

## 2023-09-12 PROCEDURE — 71045 X-RAY EXAM CHEST 1 VIEW: CPT

## 2023-09-12 PROCEDURE — 96374 THER/PROPH/DIAG INJ IV PUSH: CPT

## 2023-09-12 PROCEDURE — 87899 AGENT NOS ASSAY W/OPTIC: CPT

## 2023-09-12 PROCEDURE — 2700000000 HC OXYGEN THERAPY PER DAY

## 2023-09-12 PROCEDURE — 87449 NOS EACH ORGANISM AG IA: CPT

## 2023-09-12 PROCEDURE — 97162 PT EVAL MOD COMPLEX 30 MIN: CPT

## 2023-09-12 PROCEDURE — 93005 ELECTROCARDIOGRAM TRACING: CPT | Performed by: EMERGENCY MEDICINE

## 2023-09-12 PROCEDURE — 6370000000 HC RX 637 (ALT 250 FOR IP): Performed by: NURSE PRACTITIONER

## 2023-09-12 PROCEDURE — 80053 COMPREHEN METABOLIC PANEL: CPT

## 2023-09-12 PROCEDURE — 96361 HYDRATE IV INFUSION ADD-ON: CPT

## 2023-09-12 RX ORDER — ACETAMINOPHEN 650 MG/1
650 SUPPOSITORY RECTAL EVERY 6 HOURS PRN
Status: DISCONTINUED | OUTPATIENT
Start: 2023-09-12 | End: 2023-09-14 | Stop reason: HOSPADM

## 2023-09-12 RX ORDER — INSULIN LISPRO 100 [IU]/ML
0-16 INJECTION, SOLUTION INTRAVENOUS; SUBCUTANEOUS
Status: DISCONTINUED | OUTPATIENT
Start: 2023-09-12 | End: 2023-09-14 | Stop reason: HOSPADM

## 2023-09-12 RX ORDER — INSULIN LISPRO 100 [IU]/ML
0-4 INJECTION, SOLUTION INTRAVENOUS; SUBCUTANEOUS NIGHTLY
Status: DISCONTINUED | OUTPATIENT
Start: 2023-09-12 | End: 2023-09-14 | Stop reason: HOSPADM

## 2023-09-12 RX ORDER — SODIUM CHLORIDE 0.9 % (FLUSH) 0.9 %
5-40 SYRINGE (ML) INJECTION EVERY 12 HOURS SCHEDULED
Status: DISCONTINUED | OUTPATIENT
Start: 2023-09-12 | End: 2023-09-14 | Stop reason: HOSPADM

## 2023-09-12 RX ORDER — DEXTROSE MONOHYDRATE 100 MG/ML
INJECTION, SOLUTION INTRAVENOUS CONTINUOUS PRN
Status: DISCONTINUED | OUTPATIENT
Start: 2023-09-12 | End: 2023-09-14 | Stop reason: HOSPADM

## 2023-09-12 RX ORDER — INSULIN LISPRO 100 [IU]/ML
0-4 INJECTION, SOLUTION INTRAVENOUS; SUBCUTANEOUS
Status: DISCONTINUED | OUTPATIENT
Start: 2023-09-12 | End: 2023-09-12

## 2023-09-12 RX ORDER — METOPROLOL TARTRATE 50 MG/1
50 TABLET, FILM COATED ORAL 2 TIMES DAILY
Status: DISCONTINUED | OUTPATIENT
Start: 2023-09-12 | End: 2023-09-14 | Stop reason: HOSPADM

## 2023-09-12 RX ORDER — ONDANSETRON 2 MG/ML
4 INJECTION INTRAMUSCULAR; INTRAVENOUS EVERY 6 HOURS PRN
Status: DISCONTINUED | OUTPATIENT
Start: 2023-09-12 | End: 2023-09-14 | Stop reason: HOSPADM

## 2023-09-12 RX ORDER — INSULIN GLARGINE 100 [IU]/ML
10 INJECTION, SOLUTION SUBCUTANEOUS NIGHTLY
Status: DISCONTINUED | OUTPATIENT
Start: 2023-09-12 | End: 2023-09-12

## 2023-09-12 RX ORDER — INSULIN GLARGINE 100 [IU]/ML
15 INJECTION, SOLUTION SUBCUTANEOUS NIGHTLY
Status: DISCONTINUED | OUTPATIENT
Start: 2023-09-12 | End: 2023-09-14 | Stop reason: HOSPADM

## 2023-09-12 RX ORDER — SODIUM CHLORIDE, SODIUM LACTATE, POTASSIUM CHLORIDE, CALCIUM CHLORIDE 600; 310; 30; 20 MG/100ML; MG/100ML; MG/100ML; MG/100ML
INJECTION, SOLUTION INTRAVENOUS CONTINUOUS
Status: DISCONTINUED | OUTPATIENT
Start: 2023-09-12 | End: 2023-09-12

## 2023-09-12 RX ORDER — ASPIRIN 81 MG/1
81 TABLET ORAL DAILY
Status: DISCONTINUED | OUTPATIENT
Start: 2023-09-12 | End: 2023-09-14 | Stop reason: HOSPADM

## 2023-09-12 RX ORDER — ASPIRIN 81 MG/1
81 TABLET, CHEWABLE ORAL DAILY
Status: DISCONTINUED | OUTPATIENT
Start: 2023-09-12 | End: 2023-09-12 | Stop reason: ALTCHOICE

## 2023-09-12 RX ORDER — PANTOPRAZOLE SODIUM 20 MG/1
20 TABLET, DELAYED RELEASE ORAL DAILY PRN
Status: DISCONTINUED | OUTPATIENT
Start: 2023-09-12 | End: 2023-09-14 | Stop reason: HOSPADM

## 2023-09-12 RX ORDER — TAMSULOSIN HYDROCHLORIDE 0.4 MG/1
0.4 CAPSULE ORAL EVERY EVENING
Status: DISCONTINUED | OUTPATIENT
Start: 2023-09-12 | End: 2023-09-14 | Stop reason: HOSPADM

## 2023-09-12 RX ORDER — INSULIN LISPRO 100 [IU]/ML
0-4 INJECTION, SOLUTION INTRAVENOUS; SUBCUTANEOUS NIGHTLY
Status: DISCONTINUED | OUTPATIENT
Start: 2023-09-12 | End: 2023-09-12

## 2023-09-12 RX ORDER — TAMSULOSIN HYDROCHLORIDE 0.4 MG/1
0.4 CAPSULE ORAL DAILY
Status: DISCONTINUED | OUTPATIENT
Start: 2023-09-12 | End: 2023-09-12

## 2023-09-12 RX ORDER — ATORVASTATIN CALCIUM 40 MG/1
40 TABLET, FILM COATED ORAL NIGHTLY
Status: DISCONTINUED | OUTPATIENT
Start: 2023-09-12 | End: 2023-09-14 | Stop reason: HOSPADM

## 2023-09-12 RX ORDER — SODIUM CHLORIDE 9 MG/ML
INJECTION, SOLUTION INTRAVENOUS PRN
Status: DISCONTINUED | OUTPATIENT
Start: 2023-09-12 | End: 2023-09-14 | Stop reason: HOSPADM

## 2023-09-12 RX ORDER — SODIUM CHLORIDE 9 MG/ML
1000 INJECTION, SOLUTION INTRAVENOUS CONTINUOUS
Status: DISCONTINUED | OUTPATIENT
Start: 2023-09-12 | End: 2023-09-13

## 2023-09-12 RX ORDER — ACETAMINOPHEN 325 MG/1
650 TABLET ORAL EVERY 6 HOURS PRN
Status: DISCONTINUED | OUTPATIENT
Start: 2023-09-12 | End: 2023-09-14 | Stop reason: HOSPADM

## 2023-09-12 RX ORDER — ATORVASTATIN CALCIUM 40 MG/1
40 TABLET, FILM COATED ORAL DAILY
Status: DISCONTINUED | OUTPATIENT
Start: 2023-09-12 | End: 2023-09-12

## 2023-09-12 RX ORDER — ENOXAPARIN SODIUM 100 MG/ML
40 INJECTION SUBCUTANEOUS DAILY
Status: DISCONTINUED | OUTPATIENT
Start: 2023-09-12 | End: 2023-09-14 | Stop reason: HOSPADM

## 2023-09-12 RX ORDER — 0.9 % SODIUM CHLORIDE 0.9 %
500 INTRAVENOUS SOLUTION INTRAVENOUS ONCE
Status: COMPLETED | OUTPATIENT
Start: 2023-09-12 | End: 2023-09-12

## 2023-09-12 RX ORDER — GUAIFENESIN/DEXTROMETHORPHAN 100-10MG/5
5 SYRUP ORAL EVERY 6 HOURS PRN
Status: DISCONTINUED | OUTPATIENT
Start: 2023-09-12 | End: 2023-09-14 | Stop reason: HOSPADM

## 2023-09-12 RX ORDER — ONDANSETRON 4 MG/1
4 TABLET, ORALLY DISINTEGRATING ORAL EVERY 8 HOURS PRN
Status: DISCONTINUED | OUTPATIENT
Start: 2023-09-12 | End: 2023-09-14 | Stop reason: HOSPADM

## 2023-09-12 RX ORDER — SODIUM CHLORIDE 0.9 % (FLUSH) 0.9 %
5-40 SYRINGE (ML) INJECTION PRN
Status: DISCONTINUED | OUTPATIENT
Start: 2023-09-12 | End: 2023-09-14 | Stop reason: HOSPADM

## 2023-09-12 RX ORDER — IPRATROPIUM BROMIDE AND ALBUTEROL SULFATE 2.5; .5 MG/3ML; MG/3ML
1 SOLUTION RESPIRATORY (INHALATION)
Status: DISCONTINUED | OUTPATIENT
Start: 2023-09-12 | End: 2023-09-14 | Stop reason: HOSPADM

## 2023-09-12 RX ORDER — GUAIFENESIN 600 MG/1
600 TABLET, EXTENDED RELEASE ORAL 2 TIMES DAILY
Status: DISCONTINUED | OUTPATIENT
Start: 2023-09-12 | End: 2023-09-14 | Stop reason: HOSPADM

## 2023-09-12 RX ORDER — LISINOPRIL 20 MG/1
20 TABLET ORAL DAILY
Status: DISCONTINUED | OUTPATIENT
Start: 2023-09-12 | End: 2023-09-14 | Stop reason: HOSPADM

## 2023-09-12 RX ORDER — POLYETHYLENE GLYCOL 3350 17 G/17G
17 POWDER, FOR SOLUTION ORAL DAILY PRN
Status: DISCONTINUED | OUTPATIENT
Start: 2023-09-12 | End: 2023-09-14 | Stop reason: HOSPADM

## 2023-09-12 RX ADMIN — IPRATROPIUM BROMIDE AND ALBUTEROL SULFATE 1 DOSE: .5; 2.5 SOLUTION RESPIRATORY (INHALATION) at 11:08

## 2023-09-12 RX ADMIN — METHYLPREDNISOLONE SODIUM SUCCINATE 60 MG: 125 INJECTION, POWDER, FOR SOLUTION INTRAMUSCULAR; INTRAVENOUS at 02:20

## 2023-09-12 RX ADMIN — SODIUM CHLORIDE 500 ML: 9 INJECTION, SOLUTION INTRAVENOUS at 12:37

## 2023-09-12 RX ADMIN — IPRATROPIUM BROMIDE AND ALBUTEROL SULFATE 1 DOSE: .5; 2.5 SOLUTION RESPIRATORY (INHALATION) at 19:52

## 2023-09-12 RX ADMIN — SODIUM CHLORIDE, POTASSIUM CHLORIDE, SODIUM LACTATE AND CALCIUM CHLORIDE: 600; 310; 30; 20 INJECTION, SOLUTION INTRAVENOUS at 04:56

## 2023-09-12 RX ADMIN — IPRATROPIUM BROMIDE AND ALBUTEROL SULFATE 1 DOSE: .5; 3 SOLUTION RESPIRATORY (INHALATION) at 00:17

## 2023-09-12 RX ADMIN — INSULIN LISPRO 2 UNITS: 100 INJECTION, SOLUTION INTRAVENOUS; SUBCUTANEOUS at 08:30

## 2023-09-12 RX ADMIN — METOPROLOL TARTRATE 50 MG: 50 TABLET, FILM COATED ORAL at 08:29

## 2023-09-12 RX ADMIN — LISINOPRIL 20 MG: 20 TABLET ORAL at 08:30

## 2023-09-12 RX ADMIN — SODIUM CHLORIDE 1000 ML: 9 INJECTION, SOLUTION INTRAVENOUS at 14:59

## 2023-09-12 RX ADMIN — GUAIFENESIN 600 MG: 600 TABLET ORAL at 08:30

## 2023-09-12 RX ADMIN — IPRATROPIUM BROMIDE AND ALBUTEROL SULFATE 1 DOSE: .5; 2.5 SOLUTION RESPIRATORY (INHALATION) at 07:19

## 2023-09-12 RX ADMIN — ENOXAPARIN SODIUM 40 MG: 100 INJECTION SUBCUTANEOUS at 08:30

## 2023-09-12 RX ADMIN — ATORVASTATIN CALCIUM 40 MG: 40 TABLET, FILM COATED ORAL at 20:55

## 2023-09-12 RX ADMIN — SODIUM CHLORIDE 1000 ML: 9 INJECTION, SOLUTION INTRAVENOUS at 21:49

## 2023-09-12 RX ADMIN — INSULIN LISPRO 16 UNITS: 100 INJECTION, SOLUTION INTRAVENOUS; SUBCUTANEOUS at 16:52

## 2023-09-12 RX ADMIN — METOPROLOL TARTRATE 50 MG: 50 TABLET, FILM COATED ORAL at 20:55

## 2023-09-12 RX ADMIN — TAMSULOSIN HYDROCHLORIDE 0.4 MG: 0.4 CAPSULE ORAL at 16:53

## 2023-09-12 RX ADMIN — GUAIFENESIN 600 MG: 600 TABLET ORAL at 20:55

## 2023-09-12 RX ADMIN — INSULIN GLARGINE 15 UNITS: 100 INJECTION, SOLUTION SUBCUTANEOUS at 20:55

## 2023-09-12 RX ADMIN — ASPIRIN 81 MG: 81 TABLET, COATED ORAL at 08:30

## 2023-09-12 RX ADMIN — IPRATROPIUM BROMIDE AND ALBUTEROL SULFATE 1 DOSE: .5; 2.5 SOLUTION RESPIRATORY (INHALATION) at 15:20

## 2023-09-12 RX ADMIN — INSULIN LISPRO 4 UNITS: 100 INJECTION, SOLUTION INTRAVENOUS; SUBCUTANEOUS at 20:55

## 2023-09-12 RX ADMIN — INSULIN LISPRO 16 UNITS: 100 INJECTION, SOLUTION INTRAVENOUS; SUBCUTANEOUS at 12:36

## 2023-09-12 ASSESSMENT — ENCOUNTER SYMPTOMS
GASTROINTESTINAL NEGATIVE: 1
SHORTNESS OF BREATH: 1
EYES NEGATIVE: 1

## 2023-09-12 ASSESSMENT — PAIN SCALES - GENERAL
PAINLEVEL_OUTOF10: 0

## 2023-09-12 NOTE — ED PROVIDER NOTES
Shortness of Breath  Patient reports emergency department chief complaint of increasing cough and shortness of breath. This been going on for the past few days. The patient states that it became worse this morning. He states that it hit him just like a \"freight train\". The patient has some generalized weakness as well as increasing shortness of breath. The patient's shortness of breath is primarily when he tries to exert himself. Cough has been nonproductive. The patient does not wear oxygen at home. Review of Systems   Constitutional: Negative. HENT: Negative. Eyes: Negative. Respiratory:  Positive for shortness of breath. Cardiovascular: Negative. Gastrointestinal: Negative. Genitourinary: Negative. Musculoskeletal: Negative. Skin: Negative. Neurological: Negative. All other systems reviewed and are negative. History reviewed. No pertinent family history.   Social History     Socioeconomic History    Marital status:      Spouse name: Not on file    Number of children: 1    Years of education: Not on file    Highest education level: Not on file   Occupational History    Occupation: retired   Tobacco Use    Smoking status: Never    Smokeless tobacco: Never    Tobacco comments:     reviewed 03/16/16   Substance and Sexual Activity    Alcohol use: No    Drug use: No    Sexual activity: Yes     Partners: Female     Comment:    Other Topics Concern    Not on file   Social History Narrative    Not on file     Social Determinants of Health     Financial Resource Strain: Not on file   Food Insecurity: Not on file   Transportation Needs: Not on file   Physical Activity: Not on file   Stress: Not on file   Social Connections: Not on file   Intimate Partner Violence: Not on file   Housing Stability: Not on file     Past Surgical History:   Procedure Laterality Date    CATARACT REMOVAL  6/12    left eye    CORONARY ARTERY BYPASS GRAFT  12/14/2000    lima-lad svg-om erythema present. Eyes:      Conjunctiva/sclera: Conjunctivae normal.      Pupils: Pupils are equal, round, and reactive to light. Neck:      Vascular: No carotid bruit. Trachea: Trachea and phonation normal.      Meningeal: Brudzinski's sign and Kernig's sign absent. Cardiovascular:      Rate and Rhythm: Normal rate. Pulmonary:      Effort: Pulmonary effort is normal. No respiratory distress. Breath sounds: Wheezing present. Abdominal:      General: Bowel sounds are normal. There is no distension. Palpations: Abdomen is soft. Tenderness: There is no abdominal tenderness. Musculoskeletal:         General: No tenderness. Normal range of motion. Cervical back: Normal range of motion and neck supple. Skin:     General: Skin is warm and dry. Neurological:      Mental Status: He is alert. Mental status is at baseline. Deep Tendon Reflexes: Reflexes are normal and symmetric. Psychiatric:         Thought Content:  Thought content normal.         MDM:    Labs Reviewed   RESPIRATORY PANEL, MOLECULAR, WITH COVID-19 - Abnormal; Notable for the following components:       Result Value    Rhinovirus Enterovirus PCR DETECTED BY PCR (*)     All other components within normal limits   CBC WITH AUTO DIFFERENTIAL - Abnormal; Notable for the following components:    WBC 19.7 (*)     RBC 4.41 (*)     Hemoglobin 12.7 (*)     Hematocrit 39.3 (*)     All other components within normal limits   BASIC METABOLIC PANEL - Abnormal; Notable for the following components:    Glucose 160 (*)     All other components within normal limits   BRAIN NATRIURETIC PEPTIDE - Abnormal; Notable for the following components:    Pro-.4 (*)     All other components within normal limits   TROPONIN       CC/HPI Summary, DDx, ED Course, and Reassessment: Patient's EKG is nonacute and his cardiac enzymes are normal.  Patient's chest x-ray shows no focal airspace disease however the patient's signs and symptoms

## 2023-09-12 NOTE — CARE COORDINATION
09/12/23 0650   Service Assessment   Patient Orientation Alert and Oriented   Cognition Alert   History Provided By Patient   Primary Caregiver Self   Support Systems Spouse/Significant Other   Patient's Healthcare Decision Maker is: Legal Next of Kin   PCP Verified by CM Yes   Prior Functional Level Independent in ADLs/IADLs   Current Functional Level Independent in ADLs/IADLs   Can patient return to prior living arrangement Yes   Ability to make needs known: Good   Family able to assist with home care needs: Yes   Would you like for me to discuss the discharge plan with any other family members/significant others, and if so, who? No   Financial Resources Medicare   Community Resources None   Social/Functional History   Lives With Spouse   Type of [address] [facility] Help From Other (comment)  (Independent)   ADL Assistance Independent   Homemaking Assistance Independent   Homemaking Responsibilities Yes   Ambulation Assistance Independent   Transfer Assistance Independent   Active  No  (Rarely drives)   Patient's  Info Wife   Discharge Planning   Type of [address] Prior To Admission None   Potential Assistance Needed N/A   DME Ordered? No   Potential Assistance Purchasing Medications No   Type of Home Care Services None   Patient expects to be discharged to: House   History of falls? 0   Services At/After Discharge   Transition of Care Consult (CM Consult) N/A   Services At/After Discharge None   [employer] Information Provided? No   Mode of Transport at Discharge Self   Confirm Follow Up Transport Family   Condition of Participation: Discharge Planning   The Plan for Transition of Care is related to the following treatment goals: Patient plans to return home   The Patient and/or Patient Representative was provided with a Choice of Provider?  Patient   The Patient and/Or Patient Representative agree

## 2023-09-12 NOTE — PROGRESS NOTES
V2.0    [facility] [place]      Name:  Felipe Frankel /Age/Sex: 1940  (82 y.o. male)   MRN & CSN:  [id number] & [id number] Encounter Date/Time: 2023 10:20 AM EDT   Location:  [address] [place] [address] PCP: Adrian Simon MD     [facility] Alexus Gardner MD       Hospital Day: 2    Assessment and Recommendations   Felipe Frankel is a 82 y.o. male who presents with Rhinovirus      Plan:     1. Acute respiratory failure with hypoxia, patient is currently on 2 L normally on room air at home. Attempt to wean oxygen as tolerated  2. Acute upper respiratory illness, patient tested positive for rhinovirus, supportive care. Chest x-ray showed no acute processes. 3.  Leukocytosis, however patient is afebrile, most likely secondary to viral illness we will continue to monitor. Patient now also received Solu-Medrol  mg while in the emergency department last evening. 4.  Lactic acidosis, most likely secondary to dehydration, continue IV fluids we will repeat lactic acid at 1600. Lactic acid has been 3.8, 4.2, 7.6, and 9.5. patient has had fluid bolus and has been on continuous IV fluids. 1600 lactic acid is 5.3. Possible cause could be long-term use of metformin. ?  5. Hypertension continue Lopressor 50 mg twice daily and lisinopril 20 mg daily. 6.  Diabetes mellitus type 2, hold home p.o. medications while inpatient, patient has been placed on insulin sliding scale before meals and at bedtime, hypoglycemic protocol has been ordered monitor for signs and symptoms of hypoglycemia. Blood sugars have been elevated could be secondary to steroids given in the emergency department, however will add Lantus 10 units SQ nightly. 7.  Hyperlipidemia continue Lipitor  8. GERD continue Prilosec  9. BPH continue Flomax, follows outpatient with Dr. Magda Herndon  10.   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 Medications:   Medications:    lisinopril  20 mg Oral Daily    metoprolol tartrate  50 mg Oral BID    ipratropium 0.5 mg-albuterol 2.5 mg  1 Dose Inhalation Q4H WA RT    sodium chloride flush  5-40 mL IntraVENous 2 times per day    enoxaparin  40 mg SubCUTAneous Daily    insulin lispro  0-4 Units SubCUTAneous TID WC    insulin lispro  0-4 Units SubCUTAneous Nightly    guaiFENesin  600 mg Oral BID    aspirin  81 mg Oral Daily    atorvastatin  40 mg Oral Nightly    tamsulosin  0.4 mg Oral QPM      Infusions:    sodium chloride      lactated ringers IV soln 125 mL/hr at 09/12/23 0832    dextrose       PRN Meds: pantoprazole, 20 mg, Daily PRN  sodium chloride flush, 5-40 mL, PRN  sodium chloride, , PRN  ondansetron, 4 mg, Q8H PRN   Or  ondansetron, 4 mg, Q6H PRN  polyethylene glycol, 17 g, Daily PRN  acetaminophen, 650 mg, Q6H PRN   Or  acetaminophen, 650 mg, Q6H PRN  glucose, 4 tablet, PRN  dextrose bolus, 125 mL, PRN   Or  dextrose bolus, 250 mL, PRN  glucagon (rDNA), 1 mg, PRN  dextrose, , Continuous PRN  guaiFENesin-dextromethorphan, 5 mL, Q6H PRN        Labs and Imaging   XR CHEST PORTABLE    Result Date: 9/12/2023  EXAMINATION: ONE XRAY VIEW OF THE CHEST 9/12/2023 12:24 am COMPARISON: None. HISTORY: ORDERING SYSTEM PROVIDED HISTORY: chest pain TECHNOLOGIST PROVIDED HISTORY: Reason for exam:->chest pain Reason for Exam: chest pain Additional signs and symptoms: chest pain FINDINGS: Frontal portable view of the chest.  Normal lung volume. No focal airspace disease. No pleural effusion or pneumothorax. Cardiomegaly. Atherosclerotic and tortuous thoracic aorta. Prior median sternotomy. No focal airspace disease. Cardiomegaly.        CBC:   Recent Labs     09/12/23  0027   WBC 19.7*   HGB 12.7*        BMP:    Recent Labs     09/12/23  0027 09/12/23  0449   NA

## 2023-09-12 NOTE — PLAN OF CARE
Problem: Discharge Planning  Goal: Discharge to home or other facility with appropriate resources  Outcome: Progressing     Problem: Pain  Goal: Verbalizes/displays adequate comfort level or baseline comfort level  9/12/2023 0628 by Wilber Koch RN  Outcome: Completed     Problem: Chronic Conditions and Co-morbidities  Goal: Patient's chronic conditions and co-morbidity symptoms are monitored and maintained or improved  Outcome: Progressing     Problem: ABCDS Injury Assessment  Goal: Absence of physical injury  Outcome: Progressing     Problem: Safety - Adult  Goal: Free from fall injury  Outcome: Progressing

## 2023-09-12 NOTE — PROGRESS NOTES
4 Eyes Skin Assessment     NAME:  Joya Salgado  YOB: 1940  MEDICAL RECORD NUMBER:  [id number]    The patient is being assessed for  Admission    I agree that at least one RN has performed a thorough Head to Toe Skin Assessment on the patient. ALL assessment sites listed below have been assessed. Areas assessed by both nurses:    Head, Face, Ears, Shoulders, Back, Chest, Arms, Elbows, Hands, Sacrum. Buttock, Coccyx, Ischium, Legs. Feet and Heels, and Under Medical Devices         Does the Patient have a Wound?  No noted wound(s)       Johny Prevention initiated by RN: No  Wound Care Orders initiated by RN: No    Pressure Injury (Stage 3,4, Unstageable, DTI, NWPT, and Complex wounds) if present, place Wound referral order by RN under : No    New Ostomies, if present place, Ostomy referral order under : No     Nurse 1 eSignature: Electronically signed by Sotero Cerda RN on 9/12/23 at 5:11 AM EDT    **SHARE this note so that the co-signing nurse can place an eSignature**    Nurse 2 eSignature: Electronically signed by Briana Barrett LPN on 0/91/03 at 0:01 AM EDT

## 2023-09-12 NOTE — H&P
V2.0  History and Physical      Name:  Whitley Navas /Age/Sex: 1940  (82 y.o. male)   MRN & CSN:  [id number] & [id number] Encounter Date/Time: 2023 2:33 AM EDT   Location:   PCP: Igor Ramirez MD       Hospital Day 1    Assessment and Plan:   Whitley Navas is a 82 y.o. male with a past medical history of HTN, HLD, DMII who presents with Rhinovirus    Acute Upper Respiratory Illness/Rhinovirus/Hypoxia  - presented to ED with increasing cough and SOB; unable to maintain SpO2    >90% on RA; does not wear home O2  - CXR: No focal airspace disease. Cardiomegaly. - EKG: Normal sinus rhythm; Left axis deviation Nonspecific intraventricular  Block; Cannot rule out Septal infarct , age undetermined ; Abnormal ECG  No previous ECGs available   - Resp Panel + Rhinovirus; HDS  - Afebrile with leukocytosis (19.2); will continue gentle IVF; BNP   - Nasal canula oxygen prn to maintain SaO2 88-92% with pulse ox monitoring  - Consider NIPPV if NCO2 fails to maintain SoO2 88-92%  - Duonebs, IV Solumedrol given in ED; will continue DuoNebs, add Mucinex,     Robitussin PRN Will defer ABX pending infectious workup.   - Will check ProCal, Lactic, UA, Urine Culture, Blood Cultures per SIRS criteria  - will trend CBC, CMP  - Encourage deep breathing and coughing and incentive spirometry    Hypertension   - /79 (92) on admit   - continue home Lopressor 50 MG BID, Lisinopril 20 MG QD  - monitor BP  - ECHO 2020 (Dr. Jessy Matthews) EF 50-55%  - Dr. Ayala Ly follows for PCP    Diabetes Mellitus type II  - uncontrolled without long tern use of insulin.   - managed on Metformin, Glipizide; will hold home meds for now   - last Hgb A1C-8.6 (23), Blood glucose on admission - 160  - SSI low dose coverage  - POCT glucose qACHS  - hypoglycemia management protocol   - target blood glucose 100-180  - ADA carb controlled diet    HLD  - will continue home Lipitor 40 MG QD  - Lipid/Hepatic labs current (23)    GERD  - will

## 2023-09-12 NOTE — PROGRESS NOTES
Occupational Therapy  Facility/Department: [facility] UNIT  Occupational Therapy Initial Assessment    Name: Cali Lin  : 1940  MRN: [id number]  Date of Service: 2023    Discharge Recommendations:  Home with assist PRN, IP Rehab  OT Equipment Recommendations  Equipment Needed: No       Patient Diagnosis(es): The primary encounter diagnosis was Viral pneumonia. Diagnoses of Hypoxia and Rhinovirus infection were also pertinent to this visit. Past Medical History:  has a past medical history of CAD (coronary artery disease), Cancer ([address]), DM (diabetes mellitus) ([address]), H/O cardiac catheterization, H/O cardiovascular stress test, H/O cardiovascular stress test, H/O chest x-ray, Heart murmur, History of nuclear stress test, History of PTCA, Hx of Doppler echocardiogram, Hyperlipidemia, Hypertension, MI (myocardial infarction) ([address]), S/P CABG x 3, and SOB (shortness of breath) on exertion. Past Surgical History:  has a past surgical history that includes Coronary artery bypass graft (2000); eye surgery; and Cataract removal (). Treatment Diagnosis: Decreased endurance      Assessment   Performance deficits / Impairments: Decreased functional mobility ; Decreased endurance;Decreased ADL status; Decreased balance  Assessment: Pt is an 83 yo male admitted to [facility] with Rhinovirus. Pt would benefit from inpt Occupational Therapy to improve endurance for functional mobility, ADLs.   Treatment Diagnosis: Decreased endurance  Prognosis: Good  Decision Making: Medium Complexity  REQUIRES OT FOLLOW-UP: Yes  Activity Tolerance  Activity Tolerance: Patient Tolerated treatment well  Activity Tolerance Comments: Pt presented with increased SOB upon ambulation/exertion on 2L of O2        Plan   Occupational Therapy Plan  Times Per Week: 2+  Current Treatment Recommendations: Balance training, Functional mobility training, Endurance training, Patient/Caregiver education & training, Equipment evaluation, education, & procurement, Self-Care / ADL, Safety education & training     Restrictions  Restrictions/Precautions  Restrictions/Precautions: Fall Risk, Isolation (Droplet)  Position Activity Restriction  Other position/activity restrictions: IV, Heart moniter, O2    Subjective   General  Chart Reviewed: Yes  Patient assessed for rehabilitation services?: Yes  Family / Caregiver Present: Yes (spouse and daughter)  Subjective  Subjective: \"I am feeling ok\"  General Comment  Comments: Pt presents awake supine in bed with 2L of O2 and family present  0/10  Social/Functional History  Social/Functional History  Lives With: Spouse  Type of Home: House  Home Layout: One level  Home Access: Stairs to enter with rails  Entrance Stairs - Number of Steps: 3  Entrance Stairs - Rails: Right  Bathroom Shower/Tub: Tub/Shower unit  Bathroom Toilet: Handicap height  Home Equipment: Cane  Has the patient had two or more falls in the past year or any fall with injury in the past year?: Yes (2-3 falls in the last year. Most recent was on the steps to enter the home, pt reports he lost his balance and fell backwards)  Receives Help From: Other (comment) (Independent)  ADL Assistance: [address] Independent  Homemaking Responsibilities: No  Ambulation Assistance: Independent (with [address])  Transfer Assistance: Independent  Active : No  Patient's  Info: Wife  Leisure & Hobbies: Pt reads and watches TV  IADL Comments: Pt reports he is indep with ADLs       Objective   Pulse: (!) 102  Heart Rate Source: Monitor  BP: (!) 147/81  Patient Position: Supine;Semi fowlers  MAP (Calculated): 103  Respirations: 16  SpO2: 96 %  O2 Device: Nasal cannula          Observation/Palpation  Observation: Pt awake supine in bed with 2L of O2, heart monitor, IV  Safety Devices  Type of Devices: All fall risk precautions in place;Call light within reach; Left in bed;Gait belt;Bed alarm in place  Restraints  Restraints Initially

## 2023-09-12 NOTE — ED NOTES
Unable to take report at this time. Nurse was called in and should be here in about 20 min.        Adán Moody RN  09/12/23 5032

## 2023-09-13 LAB
ALBUMIN SERPL-MCNC: 3.7 GM/DL (ref 3.4–5)
ALP BLD-CCNC: 74 IU/L (ref 40–129)
ALT SERPL-CCNC: 26 U/L (ref 10–40)
ANION GAP SERPL CALCULATED.3IONS-SCNC: 11 MMOL/L (ref 4–16)
AST SERPL-CCNC: 16 IU/L (ref 15–37)
BASOPHILS ABSOLUTE: 0 K/CU MM
BASOPHILS RELATIVE PERCENT: 0.2 % (ref 0–1)
BILIRUB SERPL-MCNC: 0.8 MG/DL (ref 0–1)
BUN SERPL-MCNC: 16 MG/DL (ref 6–23)
CALCIUM SERPL-MCNC: 8.9 MG/DL (ref 8.3–10.6)
CHLORIDE BLD-SCNC: 104 MMOL/L (ref 99–110)
CO2: 25 MMOL/L (ref 21–32)
CREAT SERPL-MCNC: 0.8 MG/DL (ref 0.9–1.3)
DIFFERENTIAL TYPE: ABNORMAL
EOSINOPHILS ABSOLUTE: 0.1 K/CU MM
EOSINOPHILS RELATIVE PERCENT: 0.3 % (ref 0–3)
GFR SERPL CREATININE-BSD FRML MDRD: >60 ML/MIN/1.73M2
GLUCOSE BLD-MCNC: 132 MG/DL (ref 70–99)
GLUCOSE BLD-MCNC: 209 MG/DL (ref 70–99)
GLUCOSE BLD-MCNC: 214 MG/DL (ref 70–99)
GLUCOSE BLD-MCNC: 239 MG/DL (ref 70–99)
GLUCOSE SERPL-MCNC: 177 MG/DL (ref 70–99)
HCT VFR BLD CALC: 36.1 % (ref 42–52)
HEMOGLOBIN: 11.6 GM/DL (ref 13.5–18)
IMMATURE NEUTROPHIL %: 0.6 % (ref 0–0.43)
LACTIC ACID, SEPSIS: 2.5 MMOL/L (ref 0.5–1.9)
LYMPHOCYTES ABSOLUTE: 3.7 K/CU MM
LYMPHOCYTES RELATIVE PERCENT: 15.1 % (ref 24–44)
MCH RBC QN AUTO: 28.6 PG (ref 27–31)
MCHC RBC AUTO-ENTMCNC: 32.1 % (ref 32–36)
MCV RBC AUTO: 88.9 FL (ref 78–100)
MONOCYTES ABSOLUTE: 1.8 K/CU MM
MONOCYTES RELATIVE PERCENT: 7.6 % (ref 0–4)
PDW BLD-RTO: 13.2 % (ref 11.7–14.9)
PLATELET # BLD: 312 K/CU MM (ref 140–440)
PMV BLD AUTO: 9.5 FL (ref 7.5–11.1)
POTASSIUM SERPL-SCNC: 4.8 MMOL/L (ref 3.5–5.1)
RBC # BLD: 4.06 M/CU MM (ref 4.6–6.2)
SEGMENTED NEUTROPHILS ABSOLUTE COUNT: 18.5 K/CU MM
SEGMENTED NEUTROPHILS RELATIVE PERCENT: 76.2 % (ref 36–66)
SODIUM BLD-SCNC: 140 MMOL/L (ref 135–145)
TOTAL IMMATURE NEUTOROPHIL: 0.15 K/CU MM
TOTAL PROTEIN: 6 GM/DL (ref 6.4–8.2)
WBC # BLD: 24.2 K/CU MM (ref 4–10.5)

## 2023-09-13 PROCEDURE — 1200000000 HC SEMI PRIVATE

## 2023-09-13 PROCEDURE — G0378 HOSPITAL OBSERVATION PER HR: HCPCS

## 2023-09-13 PROCEDURE — 82962 GLUCOSE BLOOD TEST: CPT

## 2023-09-13 PROCEDURE — 96372 THER/PROPH/DIAG INJ SC/IM: CPT

## 2023-09-13 PROCEDURE — 6370000000 HC RX 637 (ALT 250 FOR IP): Performed by: NURSE PRACTITIONER

## 2023-09-13 PROCEDURE — 96361 HYDRATE IV INFUSION ADD-ON: CPT

## 2023-09-13 PROCEDURE — 83605 ASSAY OF LACTIC ACID: CPT

## 2023-09-13 PROCEDURE — 6370000000 HC RX 637 (ALT 250 FOR IP): Performed by: FAMILY MEDICINE

## 2023-09-13 PROCEDURE — 80053 COMPREHEN METABOLIC PANEL: CPT

## 2023-09-13 PROCEDURE — 94761 N-INVAS EAR/PLS OXIMETRY MLT: CPT

## 2023-09-13 PROCEDURE — 2580000003 HC RX 258: Performed by: FAMILY MEDICINE

## 2023-09-13 PROCEDURE — 2700000000 HC OXYGEN THERAPY PER DAY

## 2023-09-13 PROCEDURE — 2580000003 HC RX 258: Performed by: NURSE PRACTITIONER

## 2023-09-13 PROCEDURE — 94640 AIRWAY INHALATION TREATMENT: CPT

## 2023-09-13 PROCEDURE — 85025 COMPLETE CBC W/AUTO DIFF WBC: CPT

## 2023-09-13 PROCEDURE — 6360000002 HC RX W HCPCS: Performed by: FAMILY MEDICINE

## 2023-09-13 RX ADMIN — IPRATROPIUM BROMIDE AND ALBUTEROL SULFATE 1 DOSE: .5; 2.5 SOLUTION RESPIRATORY (INHALATION) at 16:00

## 2023-09-13 RX ADMIN — ASPIRIN 81 MG: 81 TABLET, COATED ORAL at 08:31

## 2023-09-13 RX ADMIN — IPRATROPIUM BROMIDE AND ALBUTEROL SULFATE 1 DOSE: .5; 2.5 SOLUTION RESPIRATORY (INHALATION) at 19:41

## 2023-09-13 RX ADMIN — ATORVASTATIN CALCIUM 40 MG: 40 TABLET, FILM COATED ORAL at 21:27

## 2023-09-13 RX ADMIN — IPRATROPIUM BROMIDE AND ALBUTEROL SULFATE 1 DOSE: .5; 2.5 SOLUTION RESPIRATORY (INHALATION) at 11:30

## 2023-09-13 RX ADMIN — BENZOCAINE 6 MG-MENTHOL 10 MG LOZENGES 1 LOZENGE: at 13:58

## 2023-09-13 RX ADMIN — INSULIN LISPRO 4 UNITS: 100 INJECTION, SOLUTION INTRAVENOUS; SUBCUTANEOUS at 17:21

## 2023-09-13 RX ADMIN — TAMSULOSIN HYDROCHLORIDE 0.4 MG: 0.4 CAPSULE ORAL at 17:21

## 2023-09-13 RX ADMIN — GUAIFENESIN 600 MG: 600 TABLET ORAL at 21:27

## 2023-09-13 RX ADMIN — GUAIFENESIN 600 MG: 600 TABLET ORAL at 08:31

## 2023-09-13 RX ADMIN — IPRATROPIUM BROMIDE AND ALBUTEROL SULFATE 1 DOSE: .5; 2.5 SOLUTION RESPIRATORY (INHALATION) at 07:30

## 2023-09-13 RX ADMIN — SODIUM CHLORIDE, PRESERVATIVE FREE 10 ML: 5 INJECTION INTRAVENOUS at 08:32

## 2023-09-13 RX ADMIN — INSULIN GLARGINE 15 UNITS: 100 INJECTION, SOLUTION SUBCUTANEOUS at 21:26

## 2023-09-13 RX ADMIN — METOPROLOL TARTRATE 50 MG: 50 TABLET, FILM COATED ORAL at 08:31

## 2023-09-13 RX ADMIN — INSULIN LISPRO 4 UNITS: 100 INJECTION, SOLUTION INTRAVENOUS; SUBCUTANEOUS at 21:26

## 2023-09-13 RX ADMIN — INSULIN LISPRO 4 UNITS: 100 INJECTION, SOLUTION INTRAVENOUS; SUBCUTANEOUS at 12:50

## 2023-09-13 RX ADMIN — METOPROLOL TARTRATE 50 MG: 50 TABLET, FILM COATED ORAL at 21:27

## 2023-09-13 RX ADMIN — SODIUM CHLORIDE, PRESERVATIVE FREE 10 ML: 5 INJECTION INTRAVENOUS at 21:26

## 2023-09-13 RX ADMIN — SODIUM CHLORIDE 1000 ML: 9 INJECTION, SOLUTION INTRAVENOUS at 05:03

## 2023-09-13 RX ADMIN — ENOXAPARIN SODIUM 40 MG: 100 INJECTION SUBCUTANEOUS at 08:32

## 2023-09-13 RX ADMIN — LISINOPRIL 20 MG: 20 TABLET ORAL at 08:31

## 2023-09-13 ASSESSMENT — PAIN SCALES - GENERAL
PAINLEVEL_OUTOF10: 0

## 2023-09-13 ASSESSMENT — PAIN DESCRIPTION - PAIN TYPE: TYPE: OTHER (COMMENT)

## 2023-09-13 ASSESSMENT — PAIN DESCRIPTION - LOCATION: LOCATION: OTHER (COMMENT)

## 2023-09-13 ASSESSMENT — PAIN DESCRIPTION - FREQUENCY: FREQUENCY: OTHER (COMMENT)

## 2023-09-13 ASSESSMENT — PAIN DESCRIPTION - ORIENTATION: ORIENTATION: OTHER (COMMENT)

## 2023-09-13 ASSESSMENT — PAIN DESCRIPTION - DESCRIPTORS: DESCRIPTORS: OTHER (COMMENT)

## 2023-09-13 ASSESSMENT — PAIN DESCRIPTION - ONSET: ONSET: OTHER (COMMENT)

## 2023-09-13 ASSESSMENT — PAIN - FUNCTIONAL ASSESSMENT: PAIN_FUNCTIONAL_ASSESSMENT: ACTIVITIES ARE NOT PREVENTED

## 2023-09-13 NOTE — PROGRESS NOTES
V2.0    [facility] [place] [facility] Progress Note      Name:  Franco Bright /Age/Sex: 1940  (82 y.o. male)   MRN & CSN:  [id number] & [id number] Encounter Date/Time: 2023 10:20 AM EDT   Location:  [id number] PCP: Wild Moran MD     [id number] Alexus Gardner MD       Hospital Day: 3    Assessment and Recommendations   Franco Bright is a 82 y.o. male who presents with Rhinovirus      Plan:     1. Acute respiratory failure with hypoxia, patient is currently on room air. 2.  Acute upper respiratory illness, patient tested positive for rhinovirus, supportive care. Chest x-ray showed no acute processes. 3.  Leukocytosis, WBC up to 24.0 however patient is afebrile, most likely secondary to viral illness we will continue to monitor. Patient  also received Solu-Medrol  mg while in the emergency department last evening. 4.  Lactic acidosis, 2.5 this a.m. down from 5.3 yesterday. Possible cause could be long-term use of metformin. ? Possibly secondary to dehydration as well. 5.  Hypertension continue Lopressor 50 mg twice daily and lisinopril 20 mg daily. 6.  Diabetes mellitus type 2, hold home p.o. medications while inpatient, patient has been placed on insulin sliding scale before meals and at bedtime, hypoglycemic protocol has been ordered monitor for signs and symptoms of hypoglycemia. Blood sugars  remain elevated could be secondary to steroids given in the emergency department, however will increase Lantus 20 units SQ nightly. 7.  Hyperlipidemia continue Lipitor  8. GERD continue Prilosec  9. BPH continue Flomax, follows outpatient with Dr. Patel [facility]  10. 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 History of CAD status post CABG x3 vessels, continue aspirin      Diet ADULT DIET; Regular; 4 carb choices (60 gm/meal);  Low Fat/Low Chol/High Fiber/ISSA   DVT Prophylaxis [x] Lovenox, []  Heparin, [] SCDs, [] Ambulation,  [] Eliquis, [] Xarelto  [] Coumadin

## 2023-09-13 NOTE — PLAN OF CARE
Problem: Discharge Planning  Goal: Discharge to home or other facility with appropriate resources  Outcome: Adequate for Discharge     Problem: Chronic Conditions and Co-morbidities  Goal: Patient's chronic conditions and co-morbidity symptoms are monitored and maintained or improved  Outcome: Adequate for Discharge     Problem: ABCDS Injury Assessment  Goal: Absence of physical injury  Outcome: Progressing     Problem: Safety - Adult  Goal: Free from fall injury  Outcome: Progressing     Problem: Respiratory - Adult  Goal: Achieves optimal ventilation and oxygenation  Outcome: Progressing     Problem: Metabolic/Fluid and Electrolytes - Adult  Goal: Glucose maintained within prescribed range  Outcome: Progressing

## 2023-09-13 NOTE — PLAN OF CARE
Problem: Discharge Planning  Goal: Discharge to home or other facility with appropriate resources  9/12/2023 2214 by Aliyah Aguayo RN  Outcome: Progressing  9/12/2023 1204 by Reynaldo Richmond RN  Outcome: Progressing     Problem: Chronic Conditions and Co-morbidities  Goal: Patient's chronic conditions and co-morbidity symptoms are monitored and maintained or improved  9/12/2023 2214 by Aliyah Aguayo RN  Outcome: Progressing  9/12/2023 1204 by Reynaldo Richmond RN  Outcome: Progressing     Problem: ABCDS Injury Assessment  Goal: Absence of physical injury  9/12/2023 2214 by Aliyah Aguayo RN  Outcome: Progressing  9/12/2023 1204 by Reynaldo Richmond RN  Outcome: Progressing     Problem: Safety - Adult  Goal: Free from fall injury  9/12/2023 2214 by Aliyah Aguayo RN  Outcome: Progressing  9/12/2023 1204 by Reynaldo Richmond RN  Outcome: Progressing     Problem: Respiratory - Adult  Goal: Achieves optimal ventilation and oxygenation  Outcome: Progressing     Problem: Metabolic/Fluid and Electrolytes - Adult  Goal: Glucose maintained within prescribed range  Outcome: Progressing

## 2023-09-14 VITALS
BODY MASS INDEX: 31.29 KG/M2 | TEMPERATURE: 97.7 F | DIASTOLIC BLOOD PRESSURE: 84 MMHG | WEIGHT: 194.7 LBS | HEART RATE: 86 BPM | SYSTOLIC BLOOD PRESSURE: 156 MMHG | RESPIRATION RATE: 16 BRPM | HEIGHT: 66 IN | OXYGEN SATURATION: 95 %

## 2023-09-14 LAB
ALBUMIN SERPL-MCNC: 3.2 GM/DL (ref 3.4–5)
ALP BLD-CCNC: 71 IU/L (ref 40–129)
ALT SERPL-CCNC: 26 U/L (ref 10–40)
ANION GAP SERPL CALCULATED.3IONS-SCNC: 9 MMOL/L (ref 4–16)
AST SERPL-CCNC: 22 IU/L (ref 15–37)
BASOPHILS ABSOLUTE: 0.1 K/CU MM
BASOPHILS RELATIVE PERCENT: 0.5 % (ref 0–1)
BILIRUB SERPL-MCNC: 0.8 MG/DL (ref 0–1)
BUN SERPL-MCNC: 13 MG/DL (ref 6–23)
CALCIUM SERPL-MCNC: 8.5 MG/DL (ref 8.3–10.6)
CHLORIDE BLD-SCNC: 103 MMOL/L (ref 99–110)
CO2: 27 MMOL/L (ref 21–32)
CREAT SERPL-MCNC: 0.8 MG/DL (ref 0.9–1.3)
CULTURE: NORMAL
DIFFERENTIAL TYPE: ABNORMAL
EOSINOPHILS ABSOLUTE: 0.8 K/CU MM
EOSINOPHILS RELATIVE PERCENT: 5 % (ref 0–3)
GFR SERPL CREATININE-BSD FRML MDRD: >60 ML/MIN/1.73M2
GLUCOSE BLD-MCNC: 125 MG/DL (ref 70–99)
GLUCOSE BLD-MCNC: 169 MG/DL (ref 70–99)
GLUCOSE BLD-MCNC: 233 MG/DL (ref 70–99)
GLUCOSE SERPL-MCNC: 135 MG/DL (ref 70–99)
HCT VFR BLD CALC: 36.4 % (ref 42–52)
HCT VFR BLD CALC: 36.4 % (ref 42–52)
HEMOGLOBIN: 11.6 GM/DL (ref 13.5–18)
HEMOGLOBIN: 11.6 GM/DL (ref 13.5–18)
IMMATURE NEUTROPHIL %: 0.7 % (ref 0–0.43)
LYMPHOCYTES ABSOLUTE: 3.1 K/CU MM
LYMPHOCYTES RELATIVE PERCENT: 20.6 % (ref 24–44)
Lab: NORMAL
MCH RBC QN AUTO: 28.6 PG (ref 27–31)
MCH RBC QN AUTO: 28.9 PG (ref 27–31)
MCHC RBC AUTO-ENTMCNC: 31.9 % (ref 32–36)
MCHC RBC AUTO-ENTMCNC: 31.9 % (ref 32–36)
MCV RBC AUTO: 89.9 FL (ref 78–100)
MCV RBC AUTO: 90.8 FL (ref 78–100)
MONOCYTES ABSOLUTE: 1.2 K/CU MM
MONOCYTES RELATIVE PERCENT: 7.9 % (ref 0–4)
PDW BLD-RTO: 13.2 % (ref 11.7–14.9)
PDW BLD-RTO: 13.4 % (ref 11.7–14.9)
PLATELET # BLD: 293 K/CU MM (ref 140–440)
PLATELET # BLD: 304 K/CU MM (ref 140–440)
PMV BLD AUTO: 9 FL (ref 7.5–11.1)
PMV BLD AUTO: 9.1 FL (ref 7.5–11.1)
POTASSIUM SERPL-SCNC: 4.1 MMOL/L (ref 3.5–5.1)
RBC # BLD: 4.01 M/CU MM (ref 4.6–6.2)
RBC # BLD: 4.05 M/CU MM (ref 4.6–6.2)
SEGMENTED NEUTROPHILS ABSOLUTE COUNT: 9.9 K/CU MM
SEGMENTED NEUTROPHILS RELATIVE PERCENT: 65.3 % (ref 36–66)
SODIUM BLD-SCNC: 139 MMOL/L (ref 135–145)
SPECIMEN: NORMAL
TOTAL IMMATURE NEUTOROPHIL: 0.11 K/CU MM
TOTAL PROTEIN: 5.9 GM/DL (ref 6.4–8.2)
WBC # BLD: 15.1 K/CU MM (ref 4–10.5)
WBC # BLD: 16.6 K/CU MM (ref 4–10.5)

## 2023-09-14 PROCEDURE — 2580000003 HC RX 258: Performed by: FAMILY MEDICINE

## 2023-09-14 PROCEDURE — 82962 GLUCOSE BLOOD TEST: CPT

## 2023-09-14 PROCEDURE — G0378 HOSPITAL OBSERVATION PER HR: HCPCS

## 2023-09-14 PROCEDURE — 36415 COLL VENOUS BLD VENIPUNCTURE: CPT

## 2023-09-14 PROCEDURE — 6360000002 HC RX W HCPCS: Performed by: FAMILY MEDICINE

## 2023-09-14 PROCEDURE — 6370000000 HC RX 637 (ALT 250 FOR IP): Performed by: NURSE PRACTITIONER

## 2023-09-14 PROCEDURE — 80053 COMPREHEN METABOLIC PANEL: CPT

## 2023-09-14 PROCEDURE — 94761 N-INVAS EAR/PLS OXIMETRY MLT: CPT

## 2023-09-14 PROCEDURE — 6370000000 HC RX 637 (ALT 250 FOR IP): Performed by: FAMILY MEDICINE

## 2023-09-14 PROCEDURE — 94640 AIRWAY INHALATION TREATMENT: CPT

## 2023-09-14 PROCEDURE — 85027 COMPLETE CBC AUTOMATED: CPT

## 2023-09-14 PROCEDURE — 96372 THER/PROPH/DIAG INJ SC/IM: CPT

## 2023-09-14 PROCEDURE — 85025 COMPLETE CBC W/AUTO DIFF WBC: CPT

## 2023-09-14 RX ORDER — ALBUTEROL SULFATE 90 UG/1
2 AEROSOL, METERED RESPIRATORY (INHALATION) 4 TIMES DAILY PRN
Qty: 54 G | Refills: 1 | Status: SHIPPED | OUTPATIENT
Start: 2023-09-14

## 2023-09-14 RX ADMIN — LISINOPRIL 20 MG: 20 TABLET ORAL at 09:30

## 2023-09-14 RX ADMIN — INSULIN LISPRO 4 UNITS: 100 INJECTION, SOLUTION INTRAVENOUS; SUBCUTANEOUS at 12:32

## 2023-09-14 RX ADMIN — METOPROLOL TARTRATE 50 MG: 50 TABLET, FILM COATED ORAL at 09:30

## 2023-09-14 RX ADMIN — TAMSULOSIN HYDROCHLORIDE 0.4 MG: 0.4 CAPSULE ORAL at 17:04

## 2023-09-14 RX ADMIN — IPRATROPIUM BROMIDE AND ALBUTEROL SULFATE 1 DOSE: .5; 2.5 SOLUTION RESPIRATORY (INHALATION) at 14:30

## 2023-09-14 RX ADMIN — ASPIRIN 81 MG: 81 TABLET, COATED ORAL at 09:29

## 2023-09-14 RX ADMIN — GUAIFENESIN 600 MG: 600 TABLET ORAL at 09:30

## 2023-09-14 RX ADMIN — SODIUM CHLORIDE, PRESERVATIVE FREE 10 ML: 5 INJECTION INTRAVENOUS at 09:31

## 2023-09-14 RX ADMIN — ENOXAPARIN SODIUM 40 MG: 100 INJECTION SUBCUTANEOUS at 09:29

## 2023-09-14 RX ADMIN — IPRATROPIUM BROMIDE AND ALBUTEROL SULFATE 1 DOSE: .5; 2.5 SOLUTION RESPIRATORY (INHALATION) at 07:45

## 2023-09-14 ASSESSMENT — PAIN SCALES - GENERAL
PAINLEVEL_OUTOF10: 0

## 2023-09-14 NOTE — PLAN OF CARE
Problem: Discharge Planning  Goal: Discharge to home or other facility with appropriate resources  9/13/2023 2218 by Mikel Paul RN  Outcome: Progressing  9/13/2023 1234 by Kevan Bardales RN  Outcome: Adequate for Discharge     Problem: Chronic Conditions and Co-morbidities  Goal: Patient's chronic conditions and co-morbidity symptoms are monitored and maintained or improved  9/13/2023 2218 by Mikel Paul RN  Outcome: Progressing  9/13/2023 1234 by Kevan Bardales RN  Outcome: Adequate for Discharge     Problem: ABCDS Injury Assessment  Goal: Absence of physical injury  9/13/2023 2218 by Mikel Paul RN  Outcome: Progressing  9/13/2023 1234 by Kevan Bardales RN  Outcome: Progressing     Problem: Safety - Adult  Goal: Free from fall injury  9/13/2023 2218 by Mikel Paul RN  Outcome: Progressing  9/13/2023 1234 by Kevan Bardales RN  Outcome: Progressing     Problem: Respiratory - Adult  Goal: Achieves optimal ventilation and oxygenation  9/13/2023 2218 by Mikel Paul RN  Outcome: Progressing  9/13/2023 1234 by Kevan Bardales RN  Outcome: Progressing     Problem: Metabolic/Fluid and Electrolytes - Adult  Goal: Glucose maintained within prescribed range  9/13/2023 2218 by Mikel Paul RN  Outcome: Progressing  9/13/2023 1234 by Kevan Bardales RN  Outcome: Progressing

## 2023-09-14 NOTE — DISCHARGE SUMMARY
V2.0  Discharge Summary    Name:  Steven Boland /Age/Sex: 1940 (82 y.o. male)   Admit Date: 2023  Discharge Date: 23    MRN & CSN:  [id number] & [id number] Encounter Date and Time 23 2:17 PM EDT    Attending:  Emeli Stanley MD Discharging Provider: CORBIN Blackwood NP       Hospital Course:     Brief HPI:   Steven Boland is a 82 y.o. male who presents with increasing cough and shortness of breath. He admits that he doesn't feel good and has not felt good for the past few days. He denies fever, chills, N/V/D, constipation, HA, LOC, dizziness, numbness or tingling. He admits shortness of breath is worse when coughing and with exertion. He denies recent or ill contacts. He denies wearing O2 at home. Patient seen and examined today, he is doing well he has no complaints. He has been on room air x48 hours. He does have rhinovirus. He will follow-up with his PCP outpatient. He will be discharged home today in stable condition. Brief Problem Based Course:   1. Acute respiratory failure with hypoxia, resolved  2. Acute upper respiratory illness, patient tested positive for rhinovirus, supportive care. Chest x-ray showed no acute processes. 3.  Leukocytosis, trending downward  4. Lactic acidosis, continues to trend downward. Possible cause could be long-term use of metformin. ? Possibly secondary to dehydration as well. 5.  Hypertension continue Lopressor 50 mg twice daily and lisinopril 20 mg daily. 6.  Diabetes mellitus type 2, resume current home diabetic medications  7. Hyperlipidemia continue Lipitor  8. GERD continue Prilosec  9. BPH continue Flomax, follows outpatient with Dr. Ines Sales  10. 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   History of CAD status post CABG x3 vessels, continue aspirin      The patient expressed appropriate understanding of, and agreement with the discharge 09/12/2023 09:01 AM         Time Spent Discharging patient 35 minutes. Discharge plan discussed with my supervising physician Janelle Hand. He is in agreement with the above-noted plan of care.     Electronically signed by CORBIN Mcneill NP on 9/14/2023 at 2:17 PM

## 2023-09-14 NOTE — PROGRESS NOTES
Pt was discharged from the unit at 1830 with all personal belongings and applicable prescriptions. This nurse reviewed the follow-up appointments and Medications upon discharge were discussed with the patient and are detailed in the After Visit Summary. Patient instructed to call with any questions or concerns. To front door per W/C.         Electronically signed by Dom Leija RN on 9/14/23 at 6:50 PM EDT

## 2023-09-17 LAB
CULTURE: NORMAL
CULTURE: NORMAL
Lab: NORMAL
Lab: NORMAL
SPECIMEN: NORMAL
SPECIMEN: NORMAL

## 2024-07-16 ENCOUNTER — OFFICE VISIT (OUTPATIENT)
Dept: CARDIOLOGY CLINIC | Age: 84
End: 2024-07-16
Payer: MEDICARE

## 2024-07-16 VITALS
HEIGHT: 64 IN | WEIGHT: 179 LBS | BODY MASS INDEX: 30.56 KG/M2 | DIASTOLIC BLOOD PRESSURE: 78 MMHG | SYSTOLIC BLOOD PRESSURE: 122 MMHG | HEART RATE: 89 BPM

## 2024-07-16 DIAGNOSIS — Z95.1 S/P CABG X 3: ICD-10-CM

## 2024-07-16 DIAGNOSIS — M54.2 NECK PAIN: Primary | ICD-10-CM

## 2024-07-16 DIAGNOSIS — I10 PRIMARY HYPERTENSION: ICD-10-CM

## 2024-07-16 DIAGNOSIS — E08.00 DIABETES MELLITUS DUE TO UNDERLYING CONDITION WITH HYPEROSMOLARITY WITHOUT COMA, WITHOUT LONG-TERM CURRENT USE OF INSULIN (HCC): ICD-10-CM

## 2024-07-16 DIAGNOSIS — E78.00 PURE HYPERCHOLESTEROLEMIA: ICD-10-CM

## 2024-07-16 PROCEDURE — 3074F SYST BP LT 130 MM HG: CPT | Performed by: INTERNAL MEDICINE

## 2024-07-16 PROCEDURE — 1123F ACP DISCUSS/DSCN MKR DOCD: CPT | Performed by: INTERNAL MEDICINE

## 2024-07-16 PROCEDURE — 93000 ELECTROCARDIOGRAM COMPLETE: CPT | Performed by: INTERNAL MEDICINE

## 2024-07-16 PROCEDURE — 3078F DIAST BP <80 MM HG: CPT | Performed by: INTERNAL MEDICINE

## 2024-07-16 PROCEDURE — 99214 OFFICE O/P EST MOD 30 MIN: CPT | Performed by: INTERNAL MEDICINE

## 2024-07-16 RX ORDER — BENZONATATE 100 MG/1
200 CAPSULE ORAL 3 TIMES DAILY PRN
COMMUNITY

## 2024-07-16 RX ORDER — ATORVASTATIN CALCIUM 80 MG/1
80 TABLET, FILM COATED ORAL DAILY
Qty: 90 TABLET | Refills: 3 | Status: SHIPPED | OUTPATIENT
Start: 2024-07-16

## 2024-07-16 RX ORDER — DICYCLOMINE HCL 20 MG
20 TABLET ORAL EVERY 6 HOURS
COMMUNITY

## 2024-07-16 RX ORDER — LISINOPRIL 20 MG/1
20 TABLET ORAL DAILY
Qty: 90 TABLET | Refills: 3 | Status: SHIPPED | OUTPATIENT
Start: 2024-07-16

## 2024-07-16 RX ORDER — DAPAGLIFLOZIN 10 MG/1
10 TABLET, FILM COATED ORAL EVERY MORNING
COMMUNITY

## 2024-07-16 NOTE — PATIENT INSTRUCTIONS
Increase Atorvastatin to 80 mg daily and continue other current medications. Continue to lose weight. Lexiscan stress Cardiolite to evaluate neck pain. Repeat lipids and LFT in 2-3 months. Appropriate prescriptions if needed on this visit are addressed. After visit summery is provided.  Questions answered and patient verbalizes understanding. Follow up in 3 months,  sooner if needed.

## 2024-07-16 NOTE — PROGRESS NOTES
Repeat lipids and LFT in 2-3 months. Appropriate prescriptions if needed on this visit are addressed. After visit summery is provided.  Questions answered and patient verbalizes understanding. Follow up in 3 months,  sooner if needed.    Chelle Youngblood MD, 7/16/2024 11:08 AM     Please note this report has been partially produced using speech recognition software and may contain errors related to that system including errors in grammar, punctuation, and spelling, as well as words and phrases that may be inappropriate. If there are any questions or concerns please feel free to contact the dictating provider for clarification.

## 2024-07-16 NOTE — ASSESSMENT & PLAN NOTE
Patient is reporting neck pain which does not appear to be related to any arthritis.  It could be angina equivalent.  His last stress test was in 2020.  We will obtain nuclear  pharmacological stress test to evaluate him further.

## 2024-08-09 ENCOUNTER — TELEPHONE (OUTPATIENT)
Dept: CARDIOLOGY CLINIC | Age: 84
End: 2024-08-09

## 2024-08-09 NOTE — TELEPHONE ENCOUNTER
Called patient left a message with the results of recent testing.   NM -    Stress Test: A pharmacological stress test was performed using regadenoson (Lexiscan). 50 mg of aminophylline given as a reversal agent. Hemodynamics are adequate for diagnosis. Blood pressure demonstrated a normal response and heart rate demonstrated a normal response to stress. The patient's heart rate recovery was normal.    Perfusion Comments: LV perfusion is abnormal.  None homogenous uptake noted with inferior lateral wall defect with minimal redistribution.  Increase hepatobiliary tracer uptake noted.    Rest Function: Resting ejection fraction was 35%. EDV was 87ml. ESV was 57ml.    Image quality is fair.    Compared to previous study in 2020 there is no significant change in perfusion abnormalities however LV function has decreased from 54% to 35% now.     Recommend follow-up to discuss these results for further recommendations.    Left message for him to call the office back to schedule a follow up sooner then 10/24

## 2024-08-22 ENCOUNTER — OFFICE VISIT (OUTPATIENT)
Dept: CARDIOLOGY CLINIC | Age: 84
End: 2024-08-22
Payer: MEDICARE

## 2024-08-22 VITALS
BODY MASS INDEX: 30.97 KG/M2 | WEIGHT: 181.4 LBS | DIASTOLIC BLOOD PRESSURE: 74 MMHG | HEIGHT: 64 IN | SYSTOLIC BLOOD PRESSURE: 128 MMHG | HEART RATE: 79 BPM

## 2024-08-22 DIAGNOSIS — E78.00 PURE HYPERCHOLESTEROLEMIA: ICD-10-CM

## 2024-08-22 DIAGNOSIS — I10 PRIMARY HYPERTENSION: ICD-10-CM

## 2024-08-22 DIAGNOSIS — R06.02 SHORTNESS OF BREATH: ICD-10-CM

## 2024-08-22 DIAGNOSIS — Z95.1 S/P CABG X 3: Primary | ICD-10-CM

## 2024-08-22 PROCEDURE — 99214 OFFICE O/P EST MOD 30 MIN: CPT | Performed by: NURSE PRACTITIONER

## 2024-08-22 PROCEDURE — 1123F ACP DISCUSS/DSCN MKR DOCD: CPT | Performed by: NURSE PRACTITIONER

## 2024-08-22 PROCEDURE — 3078F DIAST BP <80 MM HG: CPT | Performed by: NURSE PRACTITIONER

## 2024-08-22 PROCEDURE — 3074F SYST BP LT 130 MM HG: CPT | Performed by: NURSE PRACTITIONER

## 2024-08-22 NOTE — PROGRESS NOTES
and heart rate demonstrated a normal response to stress. The patient's heart rate recovery was normal.    Perfusion Comments: LV perfusion is abnormal.  None homogenous uptake noted with inferior lateral wall defect with minimal redistribution.  Increase hepatobiliary tracer uptake noted.    Rest Function: Resting ejection fraction was 35%. EDV was 87ml. ESV was 57ml.    Image quality is fair.    Compared to previous study in 2020 there is no significant change in perfusion abnormalities however LV function has decreased from 54% to 35% now.    All pertinent data reviewed and discussed with patient       ASSESSMENT/PLAN:  CAD  Status post CABG x3 2000  Has abnormal stress test but appears to be the same from previous stress test 2020.   He is no longer having neck pain  His shortness of breath is unchanged.   Check Echo for WMA and EF    Hypertension  Controlled  Continue low sodium diet    Dyslipidemia   Latest Reference Range & Units 08/23/23 00:00   Chol/HDL Ratio  2 (E)   Cholesterol, Total mg/dL 156 (E)   HDL Cholesterol 35 - 70 mg/dL 45 (E)   LDL Cholesterol 0 - 160 mg/dL 71 (E)   Triglycerides mg/dL 198 (E)   VLDL mg/dL 40 (E)   (E): External lab result  Lipid panel reviewed  Patient LDL is near goal, HDL is at goal, triglycerides normal  Continue Lipitor (atorvastatin), avoid grapefruit juice with Lipitor zetia  Discussed with the patient the need for exercise, low cholesterol diet, and compliance with medications.       Tests ordered:  echo  Follow-up  1 month     Signed:  CORBIN Shah CNP, 8/22/2024, 2:10 PM    An electronic signature was used to authenticate this note.    Please note this report has been partially produced using speech recognition software and may contain errors related to that system including errors in grammar, punctuation, and spelling, as well as words and phrases that may be inappropriate. If there are any questions or concerns please feel free to contact the dictating

## 2024-09-12 ENCOUNTER — TELEPHONE (OUTPATIENT)
Dept: CARDIOLOGY CLINIC | Age: 84
End: 2024-09-12

## 2024-09-19 ENCOUNTER — OFFICE VISIT (OUTPATIENT)
Dept: CARDIOLOGY CLINIC | Age: 84
End: 2024-09-19

## 2024-09-19 VITALS
DIASTOLIC BLOOD PRESSURE: 72 MMHG | SYSTOLIC BLOOD PRESSURE: 130 MMHG | OXYGEN SATURATION: 97 % | HEART RATE: 78 BPM | BODY MASS INDEX: 32 KG/M2 | WEIGHT: 180.6 LBS | HEIGHT: 63 IN

## 2024-09-19 DIAGNOSIS — I25.5 ISCHEMIC CARDIOMYOPATHY: ICD-10-CM

## 2024-09-19 DIAGNOSIS — I10 PRIMARY HYPERTENSION: ICD-10-CM

## 2024-09-19 DIAGNOSIS — M54.2 NECK PAIN: ICD-10-CM

## 2024-09-19 DIAGNOSIS — E78.00 PURE HYPERCHOLESTEROLEMIA: Primary | ICD-10-CM

## 2024-09-19 DIAGNOSIS — E08.00 DIABETES MELLITUS DUE TO UNDERLYING CONDITION WITH HYPEROSMOLARITY WITHOUT COMA, WITHOUT LONG-TERM CURRENT USE OF INSULIN (HCC): ICD-10-CM

## 2024-09-19 DIAGNOSIS — Z95.1 S/P CABG X 3: ICD-10-CM

## 2024-09-19 DIAGNOSIS — I50.23 ACUTE ON CHRONIC SYSTOLIC CONGESTIVE HEART FAILURE (HCC): ICD-10-CM

## 2024-09-19 RX ORDER — METOPROLOL SUCCINATE 100 MG/1
100 TABLET, EXTENDED RELEASE ORAL DAILY
Qty: 30 TABLET | Refills: 3 | Status: SHIPPED | OUTPATIENT
Start: 2024-09-19

## 2024-09-19 RX ORDER — UBIDECARENONE 75 MG
50 CAPSULE ORAL DAILY
COMMUNITY

## 2024-12-11 ENCOUNTER — TELEPHONE (OUTPATIENT)
Dept: CARDIOLOGY CLINIC | Age: 84
End: 2024-12-11

## 2024-12-11 NOTE — TELEPHONE ENCOUNTER
Notified       Echo (TTE) limited   Compared to recent study in September 2024 there is no significant change.

## 2024-12-27 ENCOUNTER — OFFICE VISIT (OUTPATIENT)
Dept: CARDIOLOGY CLINIC | Age: 84
End: 2024-12-27
Payer: MEDICARE

## 2024-12-27 VITALS
DIASTOLIC BLOOD PRESSURE: 70 MMHG | HEART RATE: 81 BPM | HEIGHT: 63 IN | WEIGHT: 189.8 LBS | BODY MASS INDEX: 33.63 KG/M2 | SYSTOLIC BLOOD PRESSURE: 122 MMHG

## 2024-12-27 DIAGNOSIS — Z95.1 S/P CABG X 3: ICD-10-CM

## 2024-12-27 DIAGNOSIS — I51.89 SYSTOLIC DYSFUNCTION WITHOUT HEART FAILURE: ICD-10-CM

## 2024-12-27 DIAGNOSIS — E78.00 PURE HYPERCHOLESTEROLEMIA: ICD-10-CM

## 2024-12-27 DIAGNOSIS — I25.5 ISCHEMIC CARDIOMYOPATHY: Primary | ICD-10-CM

## 2024-12-27 DIAGNOSIS — I10 PRIMARY HYPERTENSION: ICD-10-CM

## 2024-12-27 DIAGNOSIS — I10 ESSENTIAL HYPERTENSION: ICD-10-CM

## 2024-12-27 PROCEDURE — 3074F SYST BP LT 130 MM HG: CPT | Performed by: NURSE PRACTITIONER

## 2024-12-27 PROCEDURE — 3078F DIAST BP <80 MM HG: CPT | Performed by: NURSE PRACTITIONER

## 2024-12-27 PROCEDURE — 1159F MED LIST DOCD IN RCRD: CPT | Performed by: NURSE PRACTITIONER

## 2024-12-27 PROCEDURE — 99214 OFFICE O/P EST MOD 30 MIN: CPT | Performed by: NURSE PRACTITIONER

## 2024-12-27 PROCEDURE — 1123F ACP DISCUSS/DSCN MKR DOCD: CPT | Performed by: NURSE PRACTITIONER

## 2024-12-27 RX ORDER — LISINOPRIL 30 MG/1
30 TABLET ORAL NIGHTLY
Qty: 30 TABLET | Refills: 3 | Status: SHIPPED | OUTPATIENT
Start: 2024-12-27

## 2024-12-27 RX ORDER — METOPROLOL SUCCINATE 200 MG/1
200 TABLET, EXTENDED RELEASE ORAL DAILY
Qty: 90 TABLET | Refills: 4 | Status: SHIPPED | OUTPATIENT
Start: 2024-12-27

## 2024-12-27 ASSESSMENT — ENCOUNTER SYMPTOMS
ORTHOPNEA: 0
SHORTNESS OF BREATH: 0
SLEEP DISTURBANCES DUE TO BREATHING: 0

## 2024-12-27 NOTE — PROGRESS NOTES
12/27/2024  Primary cardiologist: Dr. Priest    CC:   Jorgito  is an established 84 y.o.  male here for a follow up on cardiovascular health      SUBJECTIVE/OBJECTIVE:  HPI  Jorgito is a 84 y.o. male with a history of known coronary artery disease, hyperlipidemia, diabetes, and hypertension    Jorgito reports that he is that he has been well. His sister states that he has not had issues    His wife is with him and mostly provides the information for the visit.    Review of Systems   Constitutional: Negative for malaise/fatigue.   Eyes:  Negative for visual disturbance.   Cardiovascular:  Positive for dyspnea on exertion. Negative for chest pain, claudication, cyanosis, irregular heartbeat, leg swelling, near-syncope, orthopnea, palpitations, paroxysmal nocturnal dyspnea and syncope.   Respiratory:  Negative for shortness of breath and sleep disturbances due to breathing.    Neurological:  Negative for focal weakness, light-headedness and weakness.   Psychiatric/Behavioral:  Negative for depression. The patient is not nervous/anxious.        Vitals:    12/27/24 1054   BP: 122/70   Pulse: 81   Weight: 86.1 kg (189 lb 12.8 oz)   Height: 1.6 m (5' 3\")       Wt Readings from Last 3 Encounters:   12/27/24 86.1 kg (189 lb 12.8 oz)   12/10/24 81.9 kg (180 lb 9.6 oz)   09/19/24 81.9 kg (180 lb 9.6 oz)      Body mass index is 33.62 kg/m².     Physical Exam  Vitals reviewed.   Constitutional:       General: He is not in acute distress.     Appearance: Normal appearance. He is obese. He is not ill-appearing.   HENT:      Head: Atraumatic.   Neck:      Vascular: No carotid bruit.   Cardiovascular:      Rate and Rhythm: Normal rate and regular rhythm.      Pulses: Normal pulses.      Heart sounds: Normal heart sounds. No murmur heard.  Pulmonary:      Effort: Pulmonary effort is normal. No respiratory distress.      Breath sounds: Normal breath sounds.   Musculoskeletal:         General: No swelling or deformity.      Cervical

## 2025-02-04 DIAGNOSIS — I25.5 ISCHEMIC CARDIOMYOPATHY: Primary | ICD-10-CM

## 2025-02-13 ENCOUNTER — OFFICE VISIT (OUTPATIENT)
Dept: CARDIOLOGY CLINIC | Age: 85
End: 2025-02-13
Payer: MEDICARE

## 2025-02-13 ENCOUNTER — TELEPHONE (OUTPATIENT)
Dept: CARDIOLOGY CLINIC | Age: 85
End: 2025-02-13

## 2025-02-13 VITALS
HEIGHT: 66 IN | WEIGHT: 190 LBS | BODY MASS INDEX: 30.53 KG/M2 | DIASTOLIC BLOOD PRESSURE: 70 MMHG | HEART RATE: 88 BPM | SYSTOLIC BLOOD PRESSURE: 134 MMHG

## 2025-02-13 DIAGNOSIS — I51.89 SYSTOLIC DYSFUNCTION WITHOUT HEART FAILURE: Primary | ICD-10-CM

## 2025-02-13 DIAGNOSIS — R94.39 ABNORMAL NUCLEAR STRESS TEST: ICD-10-CM

## 2025-02-13 DIAGNOSIS — I10 PRIMARY HYPERTENSION: ICD-10-CM

## 2025-02-13 DIAGNOSIS — E78.00 PURE HYPERCHOLESTEROLEMIA: ICD-10-CM

## 2025-02-13 DIAGNOSIS — I25.5 ISCHEMIC CARDIOMYOPATHY: ICD-10-CM

## 2025-02-13 DIAGNOSIS — Z01.810 PRE-OPERATIVE CARDIOVASCULAR EXAMINATION: Primary | ICD-10-CM

## 2025-02-13 DIAGNOSIS — R01.1 HEART MURMUR: ICD-10-CM

## 2025-02-13 DIAGNOSIS — E08.00 DIABETES MELLITUS DUE TO UNDERLYING CONDITION WITH HYPEROSMOLARITY WITHOUT COMA, WITHOUT LONG-TERM CURRENT USE OF INSULIN (HCC): ICD-10-CM

## 2025-02-13 DIAGNOSIS — Z95.1 S/P CABG X 3: ICD-10-CM

## 2025-02-13 PROCEDURE — 1123F ACP DISCUSS/DSCN MKR DOCD: CPT | Performed by: INTERNAL MEDICINE

## 2025-02-13 PROCEDURE — 3075F SYST BP GE 130 - 139MM HG: CPT | Performed by: INTERNAL MEDICINE

## 2025-02-13 PROCEDURE — 1159F MED LIST DOCD IN RCRD: CPT | Performed by: INTERNAL MEDICINE

## 2025-02-13 PROCEDURE — 99214 OFFICE O/P EST MOD 30 MIN: CPT | Performed by: INTERNAL MEDICINE

## 2025-02-13 PROCEDURE — 3078F DIAST BP <80 MM HG: CPT | Performed by: INTERNAL MEDICINE

## 2025-02-13 RX ORDER — SPIRONOLACTONE 25 MG/1
25 TABLET ORAL DAILY
Qty: 90 TABLET | Refills: 1 | Status: SHIPPED | OUTPATIENT
Start: 2025-02-13

## 2025-02-13 RX ORDER — LISINOPRIL 40 MG/1
40 TABLET ORAL NIGHTLY
Qty: 90 TABLET | Refills: 3 | Status: SHIPPED | OUTPATIENT
Start: 2025-02-13

## 2025-02-13 NOTE — PROGRESS NOTES
CARDIOLOGY  NOTE    Chief Complaint: Coronary artery disease s/p CABG leg swelling    HPI:   Jorgito is a 84 y.o. year old who has Past medical history as noted below.  ED comes in with his wife who is helping with most of the information he was admitted in July 2024 at [facility] with right ankle pain he has had several episodes of severe pain in the right ankle thought to be gout related was treated with colchicine she says he is not on any medication for gout.  He does not have leg or ankle swelling anymore he has no chest pain however his echo showed reduced EF 30 to 35% which is a new drop in EF since his last echo.  He did have a stress test which did not show any clear ischemia but more or less similar to before.  Has been seeing Dr. Youngblood ever since his CABG for many years but wanted to switch over care      Current Outpatient Medications   Medication Sig Dispense Refill    metoprolol succinate (TOPROL XL) 200 MG extended release tablet Take 1 tablet by mouth daily 90 tablet 4    lisinopril (PRINIVIL;ZESTRIL) 30 MG tablet Take 1 tablet by mouth at bedtime 30 tablet 3    vitamin B-12 (CYANOCOBALAMIN) 100 MCG tablet Take 0.5 tablets by mouth daily      CRANBERRY-CALCIUM PO Take by mouth      dapagliflozin (FARXIGA) 10 MG tablet Take 1 tablet by mouth every morning      dicyclomine (BENTYL) 20 MG tablet Take 1 tablet by mouth every 6 hours      benzonatate (TESSALON) 100 MG capsule Take 2 capsules by mouth 3 times daily as needed for Cough      atorvastatin (LIPITOR) 80 MG tablet Take 1 tablet by mouth daily 90 tablet 3    albuterol sulfate HFA (VENTOLIN HFA) 108 (90 Base) MCG/ACT inhaler Inhale 2 puffs into the lungs 4 times daily as needed for Wheezing or Shortness of Breath 54 g 1    omeprazole (PRILOSEC) 20 MG delayed release capsule Take 1 capsule by mouth daily as needed      metFORMIN (GLUCOPHAGE) 500 MG tablet Take 2 tablets by mouth 2 times daily (with

## 2025-02-13 NOTE — TELEPHONE ENCOUNTER
Sung with [facility] called with concerns about Spironolactone being contraindicated with him already taking lisinopril, can cause low potassium.  Please call and let him know if he should still prescribe    Sung [phone] [place]

## 2025-02-14 ENCOUNTER — TELEPHONE (OUTPATIENT)
Dept: CARDIOLOGY CLINIC | Age: 85
End: 2025-02-14

## 2025-02-17 ENCOUNTER — TELEPHONE (OUTPATIENT)
Dept: CARDIOLOGY CLINIC | Age: 85
End: 2025-02-17

## 2025-02-17 NOTE — TELEPHONE ENCOUNTER
[facility]     Dr. Guzmaned Femi Priest     LEFT HEART CATHETERIZATION WITH POSSIBLE PERCUTANEOUS CORONARY INTERVENTION                      Patient Name: Jorgito Santo   : 1940  MRN# [id number]    Date of Procedure: 3/5/ Time: 11 Arrival Time: 9    The catheterization and angiogram are usually outpatient procedures, however if stenting is needed you may need to stay overnight. You will need to arrive at the hospital two hours before the procedure.  You will go to registration in the main lobby.  You will need to arrange for someone to drive you home.      HOSPITAL:  [facility] ([facility])      X   If you have received orders for blood work and or a chest x-ray, please have         them done on assigned date at [facility],           [facility], or [facility].     X Please do not have anything by mouth after midnight prior to or 8 hours before   the procedure.    X You may take your medications with a sip of water in the morning of your               procedure or take them with you to the hospital                     x If you take Viagra (Sildenafil) or Cialis (Tadalafil) you will need to hold it for 3 days before your procedure.

## 2025-02-28 ENCOUNTER — TELEPHONE (OUTPATIENT)
Dept: CARDIOLOGY CLINIC | Age: 85
End: 2025-02-28

## 2025-02-28 NOTE — TELEPHONE ENCOUNTER
Patient given instructions over telephone on 2/28/25.  Procedure is scheduled for 3/5/25 @ 11 Am, w/arrival @ 9 Am, @ [facility]. Medication/Education Letter gone over with patient. Procedure and risks were explained to patient. Questions answered, Patient voiced understanding.        Patient was notified that procedure date or time could be changed due to an emergency. Patient voiced understanding.

## 2025-03-03 ENCOUNTER — HOSPITAL ENCOUNTER (OUTPATIENT)
Age: 85
Discharge: HOME | End: 2025-03-03
Payer: MEDICARE

## 2025-03-03 ENCOUNTER — HOSPITAL ENCOUNTER (OUTPATIENT)
Dept: GENERAL RADIOLOGY | Age: 85
Discharge: HOME | End: 2025-03-03
Attending: INTERNAL MEDICINE
Payer: MEDICARE

## 2025-03-03 DIAGNOSIS — Z01.810 PRE-OPERATIVE CARDIOVASCULAR EXAMINATION: ICD-10-CM

## 2025-03-03 LAB
ABO + RH BLD: NORMAL
BLOOD BANK COMMENT: NORMAL
BLOOD BANK SAMPLE EXPIRATION: NORMAL
BLOOD GROUP ANTIBODIES SERPL: NEGATIVE
ERYTHROCYTE [DISTWIDTH] IN BLOOD BY AUTOMATED COUNT: 13.4 % (ref 11.7–14.9)
HCT VFR BLD AUTO: 43.7 % (ref 42–52)
HGB BLD-MCNC: 13.7 G/DL (ref 13.5–18)
MCH RBC QN AUTO: 28.3 PG (ref 27–31)
MCHC RBC AUTO-ENTMCNC: 31.4 G/DL (ref 32–36)
MCV RBC AUTO: 90.3 FL (ref 78–100)
PLATELET # BLD AUTO: 371 K/UL (ref 140–440)
PMV BLD AUTO: 9.1 FL (ref 7.5–11.1)
RBC # BLD AUTO: 4.84 M/UL (ref 4.6–6.2)
WBC OTHER # BLD: 18 K/UL (ref 4–10.5)

## 2025-03-03 PROCEDURE — 71046 X-RAY EXAM CHEST 2 VIEWS: CPT

## 2025-03-03 PROCEDURE — 86900 BLOOD TYPING SEROLOGIC ABO: CPT

## 2025-03-03 PROCEDURE — 86850 RBC ANTIBODY SCREEN: CPT

## 2025-03-03 PROCEDURE — 86901 BLOOD TYPING SEROLOGIC RH(D): CPT

## 2025-03-03 PROCEDURE — 36415 COLL VENOUS BLD VENIPUNCTURE: CPT

## 2025-03-03 PROCEDURE — 85027 COMPLETE CBC AUTOMATED: CPT

## 2025-03-05 ENCOUNTER — APPOINTMENT (OUTPATIENT)
Dept: CT IMAGING | Age: 85
End: 2025-03-05
Payer: MEDICARE

## 2025-03-05 ENCOUNTER — APPOINTMENT (OUTPATIENT)
Dept: GENERAL RADIOLOGY | Age: 85
End: 2025-03-05
Payer: MEDICARE

## 2025-03-05 ENCOUNTER — HOSPITAL ENCOUNTER (EMERGENCY)
Age: 85
Discharge: HOME | End: 2025-03-05
Attending: EMERGENCY MEDICINE
Payer: MEDICARE

## 2025-03-05 ENCOUNTER — HOSPITAL ENCOUNTER (OUTPATIENT)
Age: 85
Setting detail: OUTPATIENT SURGERY
Discharge: HOME | End: 2025-03-05
Attending: INTERNAL MEDICINE | Admitting: INTERNAL MEDICINE
Payer: MEDICARE

## 2025-03-05 VITALS
HEIGHT: 66 IN | DIASTOLIC BLOOD PRESSURE: 70 MMHG | BODY MASS INDEX: 29.73 KG/M2 | TEMPERATURE: 97.7 F | SYSTOLIC BLOOD PRESSURE: 162 MMHG | OXYGEN SATURATION: 94 % | RESPIRATION RATE: 17 BRPM | HEART RATE: 81 BPM | WEIGHT: 185 LBS

## 2025-03-05 VITALS
HEIGHT: 66 IN | SYSTOLIC BLOOD PRESSURE: 157 MMHG | DIASTOLIC BLOOD PRESSURE: 84 MMHG | BODY MASS INDEX: 30.53 KG/M2 | TEMPERATURE: 96 F | HEART RATE: 91 BPM | OXYGEN SATURATION: 92 % | WEIGHT: 190 LBS | RESPIRATION RATE: 16 BRPM

## 2025-03-05 DIAGNOSIS — M54.31 SCIATICA OF RIGHT SIDE: ICD-10-CM

## 2025-03-05 DIAGNOSIS — R94.39 ABNORMAL NUCLEAR STRESS TEST: ICD-10-CM

## 2025-03-05 DIAGNOSIS — M48.00 CENTRAL STENOSIS OF SPINAL CANAL: ICD-10-CM

## 2025-03-05 DIAGNOSIS — M47.26 OSTEOARTHRITIS OF SPINE WITH RADICULOPATHY, LUMBAR REGION: Primary | ICD-10-CM

## 2025-03-05 LAB
ALBUMIN SERPL-MCNC: 4.2 G/DL (ref 3.4–5)
ALBUMIN/GLOB SERPL: 1.8 {RATIO} (ref 1.1–2.2)
ALP SERPL-CCNC: 68 U/L (ref 40–129)
ALT SERPL-CCNC: 35 U/L (ref 10–40)
ANION GAP SERPL CALCULATED.3IONS-SCNC: 14 MMOL/L (ref 9–17)
ANION GAP SERPL CALCULATED.3IONS-SCNC: 17 MMOL/L (ref 9–17)
AST SERPL-CCNC: 30 U/L (ref 15–37)
BASOPHILS # BLD: 0.1 K/UL
BASOPHILS NFR BLD: 1 % (ref 0–1)
BILIRUB SERPL-MCNC: 0.9 MG/DL (ref 0–1)
BILIRUB UR QL STRIP: NEGATIVE
BUN SERPL-MCNC: 16 MG/DL (ref 7–20)
BUN SERPL-MCNC: 17 MG/DL (ref 7–20)
CALCIUM SERPL-MCNC: 9.4 MG/DL (ref 8.3–10.6)
CALCIUM SERPL-MCNC: 9.9 MG/DL (ref 8.3–10.6)
CHLORIDE SERPL-SCNC: 100 MMOL/L (ref 99–110)
CHLORIDE SERPL-SCNC: 103 MMOL/L (ref 99–110)
CLARITY UR: CLEAR
CO2 SERPL-SCNC: 23 MMOL/L (ref 21–32)
CO2 SERPL-SCNC: 25 MMOL/L (ref 21–32)
COLOR UR: YELLOW
COMMENT: ABNORMAL
CREAT SERPL-MCNC: 1 MG/DL (ref 0.8–1.3)
CREAT SERPL-MCNC: 1.1 MG/DL (ref 0.8–1.3)
ECHO BSA: 2 M2
EOSINOPHIL # BLD: 0.74 K/UL
EOSINOPHILS RELATIVE PERCENT: 4 % (ref 0–3)
ERYTHROCYTE [DISTWIDTH] IN BLOOD BY AUTOMATED COUNT: 13.5 % (ref 11.7–14.9)
ERYTHROCYTE [DISTWIDTH] IN BLOOD BY AUTOMATED COUNT: 13.6 % (ref 11.7–14.9)
GFR, ESTIMATED: 64 ML/MIN/1.73M2
GFR, ESTIMATED: 73 ML/MIN/1.73M2
GLUCOSE SERPL-MCNC: 148 MG/DL (ref 74–99)
GLUCOSE SERPL-MCNC: 192 MG/DL (ref 74–99)
GLUCOSE UR STRIP-MCNC: >=1000 MG/DL
HCT VFR BLD AUTO: 45 % (ref 42–52)
HCT VFR BLD AUTO: 45.3 % (ref 42–52)
HGB BLD-MCNC: 13.9 G/DL (ref 13.5–18)
HGB BLD-MCNC: 14.1 G/DL (ref 13.5–18)
HGB UR QL STRIP.AUTO: NEGATIVE
IMM GRANULOCYTES # BLD AUTO: 0.12 K/UL
IMM GRANULOCYTES NFR BLD: 1 %
KETONES UR STRIP-MCNC: 15 MG/DL
LEUKOCYTE ESTERASE UR QL STRIP: NEGATIVE
LYMPHOCYTES NFR BLD: 4.72 K/UL
LYMPHOCYTES RELATIVE PERCENT: 27 % (ref 24–44)
MCH RBC QN AUTO: 28 PG (ref 27–31)
MCH RBC QN AUTO: 28.3 PG (ref 27–31)
MCHC RBC AUTO-ENTMCNC: 30.9 G/DL (ref 32–36)
MCHC RBC AUTO-ENTMCNC: 31.1 G/DL (ref 32–36)
MCV RBC AUTO: 90.7 FL (ref 78–100)
MCV RBC AUTO: 90.8 FL (ref 78–100)
MONOCYTES NFR BLD: 1.32 K/UL
MONOCYTES NFR BLD: 8 % (ref 0–4)
NEUTROPHILS NFR BLD: 60 % (ref 36–66)
NEUTS SEG NFR BLD: 10.65 K/UL
NITRITE UR QL STRIP: NEGATIVE
PH UR STRIP: 7.5 [PH] (ref 5–8)
PLATELET # BLD AUTO: 371 K/UL (ref 140–440)
PLATELET # BLD AUTO: 375 K/UL (ref 140–440)
PMV BLD AUTO: 9.3 FL (ref 7.5–11.1)
PMV BLD AUTO: 9.4 FL (ref 7.5–11.1)
POTASSIUM SERPL-SCNC: 4.9 MMOL/L (ref 3.5–5.1)
POTASSIUM SERPL-SCNC: 5.4 MMOL/L (ref 3.5–5.1)
PROT SERPL-MCNC: 6.6 G/DL (ref 6.4–8.2)
PROT UR STRIP-MCNC: NEGATIVE MG/DL
RBC # BLD AUTO: 4.96 M/UL (ref 4.6–6.2)
RBC # BLD AUTO: 4.99 M/UL (ref 4.6–6.2)
SODIUM SERPL-SCNC: 140 MMOL/L (ref 136–145)
SODIUM SERPL-SCNC: 141 MMOL/L (ref 136–145)
SP GR UR STRIP: 1.01 (ref 1–1.03)
UROBILINOGEN UR STRIP-ACNC: 1 EU/DL (ref 0–1)
WBC OTHER # BLD: 17.2 K/UL (ref 4–10.5)
WBC OTHER # BLD: 17.7 K/UL (ref 4–10.5)

## 2025-03-05 PROCEDURE — 6370000000 HC RX 637 (ALT 250 FOR IP): Performed by: EMERGENCY MEDICINE

## 2025-03-05 PROCEDURE — C1894 INTRO/SHEATH, NON-LASER: HCPCS | Performed by: INTERNAL MEDICINE

## 2025-03-05 PROCEDURE — 6360000002 HC RX W HCPCS: Performed by: EMERGENCY MEDICINE

## 2025-03-05 PROCEDURE — 99284 EMERGENCY DEPT VISIT MOD MDM: CPT

## 2025-03-05 PROCEDURE — 85027 COMPLETE CBC AUTOMATED: CPT

## 2025-03-05 PROCEDURE — 2580000003 HC RX 258: Performed by: INTERNAL MEDICINE

## 2025-03-05 PROCEDURE — 96374 THER/PROPH/DIAG INJ IV PUSH: CPT

## 2025-03-05 PROCEDURE — 7100000011 HC PHASE II RECOVERY - ADDTL 15 MIN: Performed by: INTERNAL MEDICINE

## 2025-03-05 PROCEDURE — 93459 L HRT ART/GRFT ANGIO: CPT | Performed by: INTERNAL MEDICINE

## 2025-03-05 PROCEDURE — 7100000010 HC PHASE II RECOVERY - FIRST 15 MIN: Performed by: INTERNAL MEDICINE

## 2025-03-05 PROCEDURE — 80053 COMPREHEN METABOLIC PANEL: CPT

## 2025-03-05 PROCEDURE — 81003 URINALYSIS AUTO W/O SCOPE: CPT

## 2025-03-05 PROCEDURE — 80048 BASIC METABOLIC PNL TOTAL CA: CPT

## 2025-03-05 PROCEDURE — C1769 GUIDE WIRE: HCPCS | Performed by: INTERNAL MEDICINE

## 2025-03-05 PROCEDURE — 93458 L HRT ARTERY/VENTRICLE ANGIO: CPT | Performed by: INTERNAL MEDICINE

## 2025-03-05 PROCEDURE — 6360000002 HC RX W HCPCS: Performed by: INTERNAL MEDICINE

## 2025-03-05 PROCEDURE — C1760 CLOSURE DEV, VASC: HCPCS | Performed by: INTERNAL MEDICINE

## 2025-03-05 PROCEDURE — 73502 X-RAY EXAM HIP UNI 2-3 VIEWS: CPT

## 2025-03-05 PROCEDURE — 96375 TX/PRO/DX INJ NEW DRUG ADDON: CPT

## 2025-03-05 PROCEDURE — 6360000004 HC RX CONTRAST MEDICATION: Performed by: INTERNAL MEDICINE

## 2025-03-05 PROCEDURE — 99152 MOD SED SAME PHYS/QHP 5/>YRS: CPT | Performed by: INTERNAL MEDICINE

## 2025-03-05 PROCEDURE — 72131 CT LUMBAR SPINE W/O DYE: CPT

## 2025-03-05 PROCEDURE — 85025 COMPLETE CBC W/AUTO DIFF WBC: CPT

## 2025-03-05 PROCEDURE — 2709999900 HC NON-CHARGEABLE SUPPLY: Performed by: INTERNAL MEDICINE

## 2025-03-05 RX ORDER — SODIUM CHLORIDE 0.9 % (FLUSH) 0.9 %
5-40 SYRINGE (ML) INJECTION EVERY 12 HOURS SCHEDULED
Status: DISCONTINUED | OUTPATIENT
Start: 2025-03-05 | End: 2025-03-05 | Stop reason: HOSPADM

## 2025-03-05 RX ORDER — KETOROLAC TROMETHAMINE 15 MG/ML
15 INJECTION, SOLUTION INTRAMUSCULAR; INTRAVENOUS ONCE
Status: COMPLETED | OUTPATIENT
Start: 2025-03-05 | End: 2025-03-05

## 2025-03-05 RX ORDER — ACETAMINOPHEN 325 MG/1
650 TABLET ORAL EVERY 4 HOURS PRN
Status: DISCONTINUED | OUTPATIENT
Start: 2025-03-05 | End: 2025-03-05 | Stop reason: HOSPADM

## 2025-03-05 RX ORDER — LIDOCAINE 4 G/G
1 PATCH TOPICAL ONCE
Status: DISCONTINUED | OUTPATIENT
Start: 2025-03-05 | End: 2025-03-05 | Stop reason: HOSPADM

## 2025-03-05 RX ORDER — 0.9 % SODIUM CHLORIDE 0.9 %
500 INTRAVENOUS SOLUTION INTRAVENOUS ONCE
Status: DISCONTINUED | OUTPATIENT
Start: 2025-03-05 | End: 2025-03-05 | Stop reason: HOSPADM

## 2025-03-05 RX ORDER — MIDAZOLAM HYDROCHLORIDE 1 MG/ML
INJECTION, SOLUTION INTRAMUSCULAR; INTRAVENOUS PRN
Status: DISCONTINUED | OUTPATIENT
Start: 2025-03-05 | End: 2025-03-05 | Stop reason: HOSPADM

## 2025-03-05 RX ORDER — FENTANYL CITRATE 50 UG/ML
INJECTION, SOLUTION INTRAMUSCULAR; INTRAVENOUS PRN
Status: DISCONTINUED | OUTPATIENT
Start: 2025-03-05 | End: 2025-03-05 | Stop reason: HOSPADM

## 2025-03-05 RX ORDER — IOPAMIDOL 755 MG/ML
INJECTION, SOLUTION INTRAVASCULAR PRN
Status: DISCONTINUED | OUTPATIENT
Start: 2025-03-05 | End: 2025-03-05 | Stop reason: HOSPADM

## 2025-03-05 RX ORDER — ACETAMINOPHEN 325 MG/1
650 TABLET ORAL ONCE
Status: COMPLETED | OUTPATIENT
Start: 2025-03-05 | End: 2025-03-05

## 2025-03-05 RX ORDER — HYDRALAZINE HYDROCHLORIDE 20 MG/ML
10 INJECTION INTRAMUSCULAR; INTRAVENOUS EVERY 10 MIN PRN
Status: DISCONTINUED | OUTPATIENT
Start: 2025-03-05 | End: 2025-03-05 | Stop reason: HOSPADM

## 2025-03-05 RX ORDER — DEXAMETHASONE SODIUM PHOSPHATE 10 MG/ML
8 INJECTION, SOLUTION INTRAMUSCULAR; INTRAVENOUS ONCE
Status: COMPLETED | OUTPATIENT
Start: 2025-03-05 | End: 2025-03-05

## 2025-03-05 RX ORDER — 0.9 % SODIUM CHLORIDE 0.9 %
INTRAVENOUS SOLUTION INTRAVENOUS CONTINUOUS PRN
Status: COMPLETED | OUTPATIENT
Start: 2025-03-05 | End: 2025-03-05

## 2025-03-05 RX ORDER — SODIUM CHLORIDE 0.9 % (FLUSH) 0.9 %
5-40 SYRINGE (ML) INJECTION PRN
Status: DISCONTINUED | OUTPATIENT
Start: 2025-03-05 | End: 2025-03-05 | Stop reason: HOSPADM

## 2025-03-05 RX ORDER — SODIUM CHLORIDE 9 MG/ML
INJECTION, SOLUTION INTRAVENOUS CONTINUOUS PRN
Status: COMPLETED | OUTPATIENT
Start: 2025-03-05 | End: 2025-03-05

## 2025-03-05 RX ORDER — HEPARIN SODIUM 200 [USP'U]/100ML
INJECTION, SOLUTION INTRAVENOUS PRN
Status: DISCONTINUED | OUTPATIENT
Start: 2025-03-05 | End: 2025-03-05 | Stop reason: HOSPADM

## 2025-03-05 RX ORDER — LIDOCAINE 50 MG/G
1 PATCH TOPICAL DAILY
Qty: 10 PATCH | Refills: 0 | Status: SHIPPED | OUTPATIENT
Start: 2025-03-05 | End: 2025-03-15

## 2025-03-05 RX ORDER — TIZANIDINE 2 MG/1
2 TABLET ORAL NIGHTLY PRN
Qty: 30 TABLET | Refills: 0 | Status: SHIPPED | OUTPATIENT
Start: 2025-03-05

## 2025-03-05 RX ORDER — SODIUM CHLORIDE 9 MG/ML
INJECTION, SOLUTION INTRAVENOUS PRN
Status: DISCONTINUED | OUTPATIENT
Start: 2025-03-05 | End: 2025-03-05 | Stop reason: HOSPADM

## 2025-03-05 RX ADMIN — KETOROLAC TROMETHAMINE 15 MG: 15 INJECTION, SOLUTION INTRAMUSCULAR; INTRAVENOUS at 18:00

## 2025-03-05 RX ADMIN — ACETAMINOPHEN 650 MG: 325 TABLET ORAL at 17:59

## 2025-03-05 RX ADMIN — DEXAMETHASONE SODIUM PHOSPHATE 8 MG: 10 INJECTION INTRAMUSCULAR; INTRAVENOUS at 18:00

## 2025-03-05 ASSESSMENT — PAIN - FUNCTIONAL ASSESSMENT: PAIN_FUNCTIONAL_ASSESSMENT: 0-10

## 2025-03-05 ASSESSMENT — PAIN SCALES - GENERAL
PAINLEVEL_OUTOF10: 0

## 2025-03-05 ASSESSMENT — LIFESTYLE VARIABLES
HOW MANY STANDARD DRINKS CONTAINING ALCOHOL DO YOU HAVE ON A TYPICAL DAY: PATIENT DOES NOT DRINK
HOW OFTEN DO YOU HAVE A DRINK CONTAINING ALCOHOL: NEVER

## 2025-03-05 ASSESSMENT — PAIN DESCRIPTION - ORIENTATION: ORIENTATION: RIGHT

## 2025-03-05 ASSESSMENT — PAIN DESCRIPTION - LOCATION: LOCATION: HIP

## 2025-03-05 NOTE — DISCHARGE INSTRUCTIONS
Discharge Home Instuctions  Name:Jorgito Santo  :1940    Your instructions:    Shower only for the first 5 days, NO TUB BATHS.      Wash procedure site with mild soap, rinse and pat dry.  Do not rub over the procedure site for two (2) days.  Remove dressing and replace with a band-aid in 2 days.    Site observations-Observe the area for bleeding or hematoma (lump under the skin caused by bleeding.)      A.  Painless bruising is normal.  B.  If a firmness/swelling seems to be increasing or there is bright red bleeding from site       (hold pressure over procedure site) and  CALL 911 IMMEDIATELY.    What to do after you leave the hospital:    Recommended activity: no lifting, Driving, or Strenuous exercise for 3 days.  DO NOT lift more than 10lbs.    For your procedure you may have been given a sedative to help you relax. This drug will make you sleepy. It is usually given in a vein (by IV).  Don't do anything for 24 hours that requires attention to detail. It takes time for the medicine effects to completely wear off.  Do not sign any legally binding contracts or documents over the next 24 hours.  For your safety, you should not drive or operate any machinery that could be dangerous until the medicine wears off and you can think clearly and react easily.    Follow-up with physician in 7-10 days.    Take your medication as ordered.      If you have any questions, you may call [phone] or your physicians office

## 2025-03-05 NOTE — PROGRESS NOTES
The patient was due for discharge home.  The patient was disconnected from the monitor to be assisted to the restroom. When the patient attempted to stand up his legs were unable to hold his weight.  He was seated safely back onto his cart.  We used the \"Steady\" to get him to the bathroom safely.  The patient was changed into his clothing and his IV was removed.  Upon returning to the room the family was concerned about the patient's condition and ability to go home.  Dr. Priest was asked to come to the room to talk to the patient and his family concerning his leg weakness.  A neuro assessment was completed and the patient responded appropriately with no indication of stroke or injury.  The patient stated that he has a weak area in his lower back that he has from a previous injury not related to today's visit.  Dr. Priest came into room to evaluate the patient condition.  The doctor did not find a reason relatable to today's procedure.  The patient's family has a family member who can physically get the patient into the house but is court ordered to stay away from the property.  They may however be able to stay in the daughters residence that does not have stairs to get into the house.  Dr. Priest offered to try and get a bed for the patient to stay overnight and receive physical therapy and conjuction with case management services.  The family is unsure what to decide at this time.    The patient is currently resting safely on his cart with the family at bedside.

## 2025-03-05 NOTE — ED NOTES
Pt needing to use urinal. Reports wanting to sit on edge of bed but not standing. Pt noted to be standing with steady gait.

## 2025-03-05 NOTE — ED PROVIDER NOTES
Emergency Department Encounter    Patient: Jorgito Santo  MRN: [id number]  : 1940  Date of Evaluation: 3/5/2025  ED Provider:  Demetra Everett DO    Triage Chief Complaint:   Extremity Weakness (Had outpt heart cath done today and became weak with legs giving out on him )    [place]:  Jorgito Santo is a 84 y.o. male history of hypertension hyperlipidemia coronary artery disease status post CABG diabetes ischemic cardiomyopathy that presents to the emergency department for lower extremity weakness.  Patient states he had a heart cath this morning around 11:30 AM.  Patient states family came to get him around to when getting up out of the bed he has some right low back pain and pain down the right leg.  Patient states he barely stand and ambulate due to the pain.  Patient states his leg felt numb on the right side.  He states pain in the right lower posterior hip and back and radiates down the right leg.  He states he usually uses a cane for assistive device.  Patient denies any previous injuries or surgeries to the right leg in the past.  Patient states he has no history of any back problems or back surgeries.  Denies any history of sciatica in the past.  Patient states no falls injuries trauma no nausea vomiting diarrhea no abdominal pain no chest pain no shortness of breath.  Family at the bedside states he does wear 2 L oxygen nasal cannula at nighttime.  Patient here for evaluation.    ROS - see HPI, below listed is current ROS at time of my eval:  10 systems reviewed and negative except as above.     Past Medical History:   Diagnosis Date    CAD (coronary artery disease)     S/P CABG LIMA->LAD, SVG->Mid OM, RCA, PTCA-LAD and MI    Cancer (HCC)     prostate- in remition    DM (diabetes mellitus) ([facility]) 2015    H/O cardiac catheterization 2000    LM normal, lad gives rise to diag and then is totally occluded cx prox occluded rca has prox 50-60 hazy stenosis  lv is normal in size and shape  stenosis of spinal canal      Disposition referral (if applicable):  Thiago House MD  [address]  [place]  [facility] [address]  [phone]    Schedule an appointment as soon as possible for a visit in 1 day  for evaluation of back pain down right leg and for re-evaluation. call for an appointment    Torsten Chen MD  [address] [facility] [address]  [facility] [address]  [phone]    Schedule an appointment as soon as possible for a visit in 1 day  for evaluation of dgenerative disease of back, spinal stenosis. call for an appointment    [facility] Emergency Department  [facility] [address]  [phone]  Go in 1 day  If symptoms worsen    Disposition medications (if applicable):  New Prescriptions    LIDOCAINE (LIDODERM) 5 %    Place 1 patch onto the skin daily for 10 days 12 hours on, 12 hours off.    TIZANIDINE (ZANAFLEX) 2 MG TABLET    Take 1 tablet by mouth nightly as needed (muscle spasm)     ED Provider Disposition Time  DISPOSITION   8:07 PM                Comment: Please note this report has been produced using speech recognition software and may contain errors related to that system including errors in grammar, punctuation, and spelling, as well as words and phrases that may be inappropriate.  Efforts were made to edit the dictations.        Demetra Everett DO  03/05/25 2008

## 2025-03-05 NOTE — H&P
Jorgito Santo, 84 y.o., male    Primary care physician:  Thiago House MD     Chief Complaint: Chest Pain    History of Present Illness:   ED comes in with his wife who is helping with most of the information he was admitted in July 2024 at [facility] with right ankle pain he has had several episodes of severe pain in the right ankle thought to be gout related was treated with colchicine she says he is not on any medication for gout.  He does not have leg or ankle swelling anymore he has no chest pain however his echo showed reduced EF 30 to 35% which is a new drop in EF since his last echo.  He did have a stress test which did not show any clear ischemia but more or less similar to before.  Has been seeing Dr. Youngblood ever since his CABG for many years but wanted to switch over care     Past medical history:    has a past medical history of CAD (coronary artery disease), Cancer ([facility]), DM (diabetes mellitus) ([facility]), H/O cardiac catheterization, H/O cardiovascular stress test, H/O cardiovascular stress test, H/O chest x-ray, H/O echocardiogram, H/O echocardiogram, Heart murmur, History of nuclear stress test, History of nuclear stress test, History of PTCA, Hx of Doppler echocardiogram, Hyperlipidemia, Hypertension, MI (myocardial infarction) ([facility]), S/P CABG x 3, and SOB (shortness of breath) on exertion.  Past surgical history:   has a past surgical history that includes Coronary artery bypass graft (12/14/2000); eye surgery; Cataract removal (06/2012); and Diagnostic Cardiac Cath Lab Procedure.  Social History:   reports that he has never smoked. He has never used smokeless tobacco. He reports that he does not drink alcohol and does not use drugs.  Family history:  family history is not on file.    Allergies:      Allergies   Allergen Reactions    Codeine        Home Medications:  Prior to Admission medications    Medication Sig Start Date End Date Taking? Authorizing Provider   spironolactone (ALDACTONE)

## 2025-03-05 NOTE — PROGRESS NOTES
Post cath pt ready to discharge but cannot stand   His legs giving out on him   He says he has back pain   And spasms  He does not have any deficits  Clinically does not appear to have a CVA  APpears to be back related?  He lives with wife   Daughter at bedside   They dont want him to got to ED due to concern of FLU   We dont have bed in hospital  Family wanting to take him to other hospitals to see if bed available  Explained : in my opinion he walked a long distance today ( normally does not walk this much)  May have back issues  Needs PT /OT and back pain meds  Ideally should get better with rest . We canb admit to hospital for leg pain or weakness  There no beds in hospital ( complicates situation more )

## 2025-03-05 NOTE — PROGRESS NOTES
The family has decided to take the patient to the Emergency Department to have further evaluation due to the patient's weakness.  The family has verbally agreed to the plan and understands the plan.   Discharge instructions have been given.  The patient and family are being escorted to the Emergency Room.

## 2025-03-06 ENCOUNTER — TELEPHONE (OUTPATIENT)
Dept: CARDIOLOGY CLINIC | Age: 85
End: 2025-03-06

## 2025-03-06 NOTE — DISCHARGE INSTRUCTIONS
Follow-up with primary care physician for evaluation of degenerative change of the back and for referral for PT and orthospine doctor.  Call in the morning for an appointment.  Follow-up with orthopedic spine Dr. Chen for evaluation of degenerative back disease.  Call in the morning for an appointment  Continue home meloxicam and Tylenol for pain  Apply Lidoderm pain patch affected area twice a day  Take tizanidine muscle relaxant at night.  No drink or drive while taking  Return to the emergency department immediately any pain fever chills nausea vomiting dizzy lightheaded study worsening symptoms.

## 2025-03-13 ENCOUNTER — OFFICE VISIT (OUTPATIENT)
Dept: CARDIOLOGY CLINIC | Age: 85
End: 2025-03-13
Payer: MEDICARE

## 2025-03-13 VITALS
HEIGHT: 66 IN | SYSTOLIC BLOOD PRESSURE: 128 MMHG | HEART RATE: 84 BPM | WEIGHT: 185 LBS | BODY MASS INDEX: 29.73 KG/M2 | DIASTOLIC BLOOD PRESSURE: 70 MMHG

## 2025-03-13 DIAGNOSIS — E08.00 DIABETES MELLITUS DUE TO UNDERLYING CONDITION WITH HYPEROSMOLARITY WITHOUT COMA, WITHOUT LONG-TERM CURRENT USE OF INSULIN (HCC): ICD-10-CM

## 2025-03-13 DIAGNOSIS — R01.1 HEART MURMUR: ICD-10-CM

## 2025-03-13 DIAGNOSIS — Z95.1 S/P CABG X 3: ICD-10-CM

## 2025-03-13 DIAGNOSIS — R94.31 ABNORMAL ECG: Primary | ICD-10-CM

## 2025-03-13 DIAGNOSIS — E78.00 PURE HYPERCHOLESTEROLEMIA: ICD-10-CM

## 2025-03-13 DIAGNOSIS — I51.89 SYSTOLIC DYSFUNCTION WITHOUT HEART FAILURE: ICD-10-CM

## 2025-03-13 DIAGNOSIS — I25.5 ISCHEMIC CARDIOMYOPATHY: ICD-10-CM

## 2025-03-13 PROCEDURE — 1159F MED LIST DOCD IN RCRD: CPT | Performed by: INTERNAL MEDICINE

## 2025-03-13 PROCEDURE — 99214 OFFICE O/P EST MOD 30 MIN: CPT | Performed by: INTERNAL MEDICINE

## 2025-03-13 PROCEDURE — 3078F DIAST BP <80 MM HG: CPT | Performed by: INTERNAL MEDICINE

## 2025-03-13 PROCEDURE — 1123F ACP DISCUSS/DSCN MKR DOCD: CPT | Performed by: INTERNAL MEDICINE

## 2025-03-13 PROCEDURE — 3074F SYST BP LT 130 MM HG: CPT | Performed by: INTERNAL MEDICINE

## 2025-03-13 NOTE — PROGRESS NOTES
CARDIOLOGY  NOTE    Chief Complaint: Coronary artery disease s/p CABG leg swelling    HPI:   Jorgito is a 84 y.o. year old who has Past medical history as noted below.  ED comes in with his wife who is helping with most of the information he was admitted in July 2024 at [facility] with right ankle pain he has had several episodes of severe pain in the right ankle thought to be gout related was treated with colchicine she says he is not on any medication for gout.  He does not have leg or ankle swelling anymore he has no chest pain however his echo showed reduced EF 30 to 35% which is a new drop in EF since his last echo.  He did have a stress test which did not show any clear ischemia but more or less similar to before.  Has been seeing Dr. Youngblood ever since his CABG for many years but wanted to switch over care  He had cath in march 2025 showed occluded native vessels but all bypass grafts were patent       Current Outpatient Medications   Medication Sig Dispense Refill    lidocaine (LIDODERM) 5 % Place 1 patch onto the skin daily for 10 days 12 hours on, 12 hours off. 10 patch 0    tiZANidine (ZANAFLEX) 2 MG tablet Take 1 tablet by mouth nightly as needed (muscle spasm) 30 tablet 0    spironolactone (ALDACTONE) 25 MG tablet Take 1 tablet by mouth daily 90 tablet 1    lisinopril (PRINIVIL;ZESTRIL) 40 MG tablet Take 1 tablet by mouth at bedtime 90 tablet 3    metoprolol succinate (TOPROL XL) 200 MG extended release tablet Take 1 tablet by mouth daily 90 tablet 4    vitamin B-12 (CYANOCOBALAMIN) 100 MCG tablet Take 0.5 tablets by mouth daily      CRANBERRY-CALCIUM PO Take by mouth      dapagliflozin (FARXIGA) 10 MG tablet Take 1 tablet by mouth every morning      dicyclomine (BENTYL) 20 MG tablet Take 1 tablet by mouth every 6 hours      benzonatate (TESSALON) 100 MG capsule Take 2 capsules by mouth 3 times daily as needed for Cough      atorvastatin (LIPITOR) 80 MG

## 2025-06-05 ENCOUNTER — OFFICE VISIT (OUTPATIENT)
Dept: CARDIOLOGY CLINIC | Age: 85
End: 2025-06-05
Payer: MEDICARE

## 2025-06-05 VITALS
SYSTOLIC BLOOD PRESSURE: 102 MMHG | HEART RATE: 86 BPM | BODY MASS INDEX: 28.67 KG/M2 | DIASTOLIC BLOOD PRESSURE: 60 MMHG | HEIGHT: 66 IN | WEIGHT: 178.4 LBS

## 2025-06-05 DIAGNOSIS — I50.23 ACUTE ON CHRONIC SYSTOLIC CONGESTIVE HEART FAILURE (HCC): Primary | ICD-10-CM

## 2025-06-05 DIAGNOSIS — E78.00 PURE HYPERCHOLESTEROLEMIA: ICD-10-CM

## 2025-06-05 DIAGNOSIS — R01.1 HEART MURMUR: ICD-10-CM

## 2025-06-05 DIAGNOSIS — I51.89 SYSTOLIC DYSFUNCTION WITHOUT HEART FAILURE: ICD-10-CM

## 2025-06-05 DIAGNOSIS — Z95.1 S/P CABG X 3: ICD-10-CM

## 2025-06-05 DIAGNOSIS — R94.39 ABNORMAL NUCLEAR STRESS TEST: ICD-10-CM

## 2025-06-05 DIAGNOSIS — R94.31 ABNORMAL ECG: ICD-10-CM

## 2025-06-05 DIAGNOSIS — I25.5 ISCHEMIC CARDIOMYOPATHY: ICD-10-CM

## 2025-06-05 PROCEDURE — 3074F SYST BP LT 130 MM HG: CPT | Performed by: INTERNAL MEDICINE

## 2025-06-05 PROCEDURE — 3078F DIAST BP <80 MM HG: CPT | Performed by: INTERNAL MEDICINE

## 2025-06-05 PROCEDURE — 1159F MED LIST DOCD IN RCRD: CPT | Performed by: INTERNAL MEDICINE

## 2025-06-05 PROCEDURE — 99214 OFFICE O/P EST MOD 30 MIN: CPT | Performed by: INTERNAL MEDICINE

## 2025-06-05 PROCEDURE — 1123F ACP DISCUSS/DSCN MKR DOCD: CPT | Performed by: INTERNAL MEDICINE

## 2025-06-05 NOTE — PROGRESS NOTES
CARDIOLOGY  NOTE    Chief Complaint: Coronary artery disease s/p CABG leg swelling    HPI:   Jorgito is a 84 y.o. year old who has Past medical history as noted below.  Ed is recovering from recent COPD exacerbation and SOB go treated with antibiotics and steroids   He is taking his cardiac meds     ED seen with  his wife who is helping with most of the information he was admitted in July 2024 at [facility] with right ankle pain he has had several episodes of severe pain in the right ankle thought to be gout related was treated with colchicine she says he is not on any medication for gout.  He does not have leg or ankle swelling anymore he has no chest pain however his echo showed reduced EF 30 to 35% which is a new drop in EF since his last echo.  He did have a stress test which did not show any clear ischemia but more or less similar to before.  Has been seeing Dr. Youngblood ever since his CABG for many years but wanted to switch over care  He had cath in march 2025 showed occluded native vessels but all bypass grafts were patent       Current Outpatient Medications   Medication Sig Dispense Refill    tiZANidine (ZANAFLEX) 2 MG tablet Take 1 tablet by mouth nightly as needed (muscle spasm) 30 tablet 0    lisinopril (PRINIVIL;ZESTRIL) 40 MG tablet Take 1 tablet by mouth at bedtime 90 tablet 3    metoprolol succinate (TOPROL XL) 200 MG extended release tablet Take 1 tablet by mouth daily 90 tablet 4    vitamin B-12 (CYANOCOBALAMIN) 100 MCG tablet Take 0.5 tablets by mouth daily      CRANBERRY-CALCIUM PO Take by mouth      dapagliflozin (FARXIGA) 10 MG tablet Take 1 tablet by mouth every morning      dicyclomine (BENTYL) 20 MG tablet Take 1 tablet by mouth every 6 hours      benzonatate (TESSALON) 100 MG capsule Take 2 capsules by mouth 3 times daily as needed for Cough      atorvastatin (LIPITOR) 80 MG tablet Take 1 tablet by mouth daily 90 tablet 3    albuterol sulfate HFA

## 2025-06-20 ENCOUNTER — RESULTS FOLLOW-UP (OUTPATIENT)
Dept: CARDIOLOGY CLINIC | Age: 85
End: 2025-06-20

## 2025-07-01 ENCOUNTER — TELEPHONE (OUTPATIENT)
Dept: PULMONOLOGY | Age: 85
End: 2025-07-01

## 2025-07-11 DIAGNOSIS — E78.00 PURE HYPERCHOLESTEROLEMIA: ICD-10-CM

## 2025-07-11 RX ORDER — ATORVASTATIN CALCIUM 80 MG/1
80 TABLET, FILM COATED ORAL DAILY
Qty: 90 TABLET | Refills: 3 | Status: SHIPPED | OUTPATIENT
Start: 2025-07-11

## 2025-08-15 RX ORDER — SPIRONOLACTONE 25 MG/1
25 TABLET ORAL DAILY
Qty: 90 TABLET | Refills: 0 | Status: SHIPPED | OUTPATIENT
Start: 2025-08-15

## 2025-08-27 ENCOUNTER — TELEPHONE (OUTPATIENT)
Dept: PULMONOLOGY | Age: 85
End: 2025-08-27

## 2025-08-27 ENCOUNTER — OFFICE VISIT (OUTPATIENT)
Dept: PULMONOLOGY | Age: 85
End: 2025-08-27
Payer: MEDICARE

## 2025-08-27 VITALS
WEIGHT: 175 LBS | OXYGEN SATURATION: 93 % | HEART RATE: 83 BPM | BODY MASS INDEX: 28.12 KG/M2 | SYSTOLIC BLOOD PRESSURE: 138 MMHG | HEIGHT: 66 IN | DIASTOLIC BLOOD PRESSURE: 62 MMHG

## 2025-08-27 DIAGNOSIS — R06.2 WHEEZING: Primary | ICD-10-CM

## 2025-08-27 DIAGNOSIS — R06.02 SOBOE (SHORTNESS OF BREATH ON EXERTION): ICD-10-CM

## 2025-08-27 DIAGNOSIS — E66.3 OVERWEIGHT (BMI 25.0-29.9): ICD-10-CM

## 2025-08-27 PROCEDURE — 3078F DIAST BP <80 MM HG: CPT | Performed by: INTERNAL MEDICINE

## 2025-08-27 PROCEDURE — 1159F MED LIST DOCD IN RCRD: CPT | Performed by: INTERNAL MEDICINE

## 2025-08-27 PROCEDURE — 3075F SYST BP GE 130 - 139MM HG: CPT | Performed by: INTERNAL MEDICINE

## 2025-08-27 PROCEDURE — 1123F ACP DISCUSS/DSCN MKR DOCD: CPT | Performed by: INTERNAL MEDICINE

## 2025-08-27 PROCEDURE — 99204 OFFICE O/P NEW MOD 45 MIN: CPT | Performed by: INTERNAL MEDICINE

## 2025-08-27 RX ORDER — PREDNISONE 10 MG/1
TABLET ORAL
Qty: 20 TABLET | Refills: 0 | Status: SHIPPED | OUTPATIENT
Start: 2025-08-27

## 2025-08-27 ASSESSMENT — ENCOUNTER SYMPTOMS
EYE DISCHARGE: 0
COUGH: 1
WHEEZING: 1
EYE ITCHING: 0
ABDOMINAL PAIN: 0
ABDOMINAL DISTENTION: 0
BACK PAIN: 0

## 2025-08-27 ASSESSMENT — SLEEP AND FATIGUE QUESTIONNAIRES
HOW LIKELY ARE YOU TO NOD OFF OR FALL ASLEEP IN A CAR, WHILE STOPPED FOR A FEW MINUTES IN TRAFFIC: SLIGHT CHANCE OF DOZING
HOW LIKELY ARE YOU TO NOD OFF OR FALL ASLEEP WHILE SITTING AND READING: SLIGHT CHANCE OF DOZING
HOW LIKELY ARE YOU TO NOD OFF OR FALL ASLEEP WHILE LYING DOWN TO REST IN THE AFTERNOON WHEN CIRCUMSTANCES PERMIT: MODERATE CHANCE OF DOZING
HOW LIKELY ARE YOU TO NOD OFF OR FALL ASLEEP WHILE SITTING QUIETLY AFTER LUNCH WITHOUT ALCOHOL: SLIGHT CHANCE OF DOZING
HOW LIKELY ARE YOU TO NOD OFF OR FALL ASLEEP WHEN YOU ARE A PASSENGER IN A CAR FOR AN HOUR WITHOUT A BREAK: SLIGHT CHANCE OF DOZING
ESS TOTAL SCORE: 9
HOW LIKELY ARE YOU TO NOD OFF OR FALL ASLEEP WHILE SITTING INACTIVE IN A PUBLIC PLACE: SLIGHT CHANCE OF DOZING
HOW LIKELY ARE YOU TO NOD OFF OR FALL ASLEEP WHILE SITTING AND TALKING TO SOMEONE: SLIGHT CHANCE OF DOZING
HOW LIKELY ARE YOU TO NOD OFF OR FALL ASLEEP WHILE WATCHING TV: SLIGHT CHANCE OF DOZING

## (undated) DEVICE — PINNACLE INTRODUCER SHEATH: Brand: PINNACLE

## (undated) DEVICE — ANGIOGRAPHY KIT CUST MANIFOLD

## (undated) DEVICE — CATHETER ANGIO IM 0.056 INX6 FRX100 CM EXPO

## (undated) DEVICE — ANGIO-SEAL VIP VASCULAR CLOSURE DEVICE: Brand: ANGIO-SEAL

## (undated) DEVICE — GUIDEWIRE VASC L260CM DIA0.035IN RAD 3MM J TIP L7CM PTFE

## (undated) DEVICE — KIT MICROINTRODUCER 4FR ECHOGENIC NDL L7CM 21GA STIFF COAX

## (undated) DEVICE — CATHETER ANGIO 6FR L100CM DIA0.056IN FL4 CRV VASC ACCS EXPO

## (undated) DEVICE — CATHETER ANGIO 6FR L100CM DIA0.056IN FR4 CRV VASC ACCS EXPO

## (undated) DEVICE — PINNACLE R/O II INTRODUCER SHEATH WITH RADIOPAQUE MARKER: Brand: PINNACLE

## (undated) DEVICE — Device